# Patient Record
Sex: FEMALE | Race: WHITE | Employment: FULL TIME | URBAN - METROPOLITAN AREA
[De-identification: names, ages, dates, MRNs, and addresses within clinical notes are randomized per-mention and may not be internally consistent; named-entity substitution may affect disease eponyms.]

---

## 2018-03-12 ENCOUNTER — HOSPITAL ENCOUNTER (OUTPATIENT)
Dept: PREADMISSION TESTING | Age: 53
Discharge: HOME | End: 2018-03-12
Payer: COMMERCIAL

## 2018-03-12 VITALS
TEMPERATURE: 97.9 F | HEART RATE: 55 BPM | SYSTOLIC BLOOD PRESSURE: 112 MMHG | WEIGHT: 226 LBS | HEIGHT: 67 IN | DIASTOLIC BLOOD PRESSURE: 76 MMHG | BODY MASS INDEX: 35.47 KG/M2

## 2018-03-12 LAB
ABO + RH BLD: NORMAL
ANION GAP SERPL CALC-SCNC: 4 MMOL/L (ref 5–15)
APPEARANCE UR: CLEAR
BACTERIA URNS QL MICRO: ABNORMAL /HPF
BILIRUB UR QL: NEGATIVE
BLOOD GROUP ANTIBODIES SERPL: NORMAL
BUN SERPL-MCNC: 17 MG/DL (ref 6–20)
BUN/CREAT SERPL: 20 (ref 12–20)
CALCIUM SERPL-MCNC: 9.4 MG/DL (ref 8.5–10.1)
CHLORIDE SERPL-SCNC: 104 MMOL/L (ref 97–108)
CO2 SERPL-SCNC: 31 MMOL/L (ref 21–32)
COLOR UR: ABNORMAL
CREAT SERPL-MCNC: 0.84 MG/DL (ref 0.55–1.02)
EPITH CASTS URNS QL MICRO: ABNORMAL /LPF
ERYTHROCYTE [DISTWIDTH] IN BLOOD BY AUTOMATED COUNT: 12.1 % (ref 11.5–14.5)
EST. AVERAGE GLUCOSE BLD GHB EST-MCNC: 120 MG/DL
GLUCOSE SERPL-MCNC: 89 MG/DL (ref 65–100)
GLUCOSE UR STRIP.AUTO-MCNC: NEGATIVE MG/DL
HBA1C MFR BLD: 5.8 % (ref 4.2–6.3)
HCT VFR BLD AUTO: 43.6 % (ref 35–47)
HGB BLD-MCNC: 13.7 G/DL (ref 11.5–16)
HGB UR QL STRIP: NEGATIVE
INR PPP: 1 (ref 0.9–1.1)
KETONES UR QL STRIP.AUTO: NEGATIVE MG/DL
LEUKOCYTE ESTERASE UR QL STRIP.AUTO: ABNORMAL
MCH RBC QN AUTO: 27.1 PG (ref 26–34)
MCHC RBC AUTO-ENTMCNC: 31.4 G/DL (ref 30–36.5)
MCV RBC AUTO: 86.2 FL (ref 80–99)
NITRITE UR QL STRIP.AUTO: NEGATIVE
NRBC # BLD: 0 K/UL (ref 0–0.01)
NRBC BLD-RTO: 0 PER 100 WBC
PH UR STRIP: 6 [PH] (ref 5–8)
PLATELET # BLD AUTO: 330 K/UL (ref 150–400)
PMV BLD AUTO: 10.1 FL (ref 8.9–12.9)
POTASSIUM SERPL-SCNC: 4.5 MMOL/L (ref 3.5–5.1)
PROT UR STRIP-MCNC: NEGATIVE MG/DL
PROTHROMBIN TIME: 10.1 SEC (ref 9–11.1)
RBC # BLD AUTO: 5.06 M/UL (ref 3.8–5.2)
RBC #/AREA URNS HPF: ABNORMAL /HPF (ref 0–5)
SODIUM SERPL-SCNC: 139 MMOL/L (ref 136–145)
SP GR UR REFRACTOMETRY: 1.01 (ref 1–1.03)
SPECIMEN EXP DATE BLD: NORMAL
UA: UC IF INDICATED,UAUC: ABNORMAL
UROBILINOGEN UR QL STRIP.AUTO: 0.2 EU/DL (ref 0.2–1)
WBC # BLD AUTO: 8.3 K/UL (ref 3.6–11)
WBC URNS QL MICRO: ABNORMAL /HPF (ref 0–4)

## 2018-03-12 PROCEDURE — 85027 COMPLETE CBC AUTOMATED: CPT | Performed by: ORTHOPAEDIC SURGERY

## 2018-03-12 PROCEDURE — 93005 ELECTROCARDIOGRAM TRACING: CPT

## 2018-03-12 PROCEDURE — 85610 PROTHROMBIN TIME: CPT | Performed by: ORTHOPAEDIC SURGERY

## 2018-03-12 PROCEDURE — 86901 BLOOD TYPING SEROLOGIC RH(D): CPT | Performed by: ORTHOPAEDIC SURGERY

## 2018-03-12 PROCEDURE — 81001 URINALYSIS AUTO W/SCOPE: CPT | Performed by: ORTHOPAEDIC SURGERY

## 2018-03-12 PROCEDURE — 83036 HEMOGLOBIN GLYCOSYLATED A1C: CPT | Performed by: ORTHOPAEDIC SURGERY

## 2018-03-12 PROCEDURE — 80048 BASIC METABOLIC PNL TOTAL CA: CPT | Performed by: ORTHOPAEDIC SURGERY

## 2018-03-12 PROCEDURE — 87086 URINE CULTURE/COLONY COUNT: CPT | Performed by: ORTHOPAEDIC SURGERY

## 2018-03-12 RX ORDER — DEXTROAMPHETAMINE SACCHARATE, AMPHETAMINE ASPARTATE MONOHYDRATE, DEXTROAMPHETAMINE SULFATE AND AMPHETAMINE SULFATE 5; 5; 5; 5 MG/1; MG/1; MG/1; MG/1
20 CAPSULE, EXTENDED RELEASE ORAL
COMMUNITY

## 2018-03-12 RX ORDER — BISMUTH SUBSALICYLATE 262 MG
1 TABLET,CHEWABLE ORAL DAILY
COMMUNITY

## 2018-03-12 RX ORDER — LEVOTHYROXINE SODIUM 50 UG/1
50 TABLET ORAL
COMMUNITY

## 2018-03-12 RX ORDER — HYDROCHLOROTHIAZIDE 25 MG/1
25 TABLET ORAL DAILY
COMMUNITY

## 2018-03-12 RX ORDER — IBUPROFEN 800 MG/1
800 TABLET ORAL DAILY
COMMUNITY
End: 2018-03-28

## 2018-03-12 RX ORDER — CHOLECALCIFEROL (VITAMIN D3) 125 MCG
220 CAPSULE ORAL AS NEEDED
COMMUNITY
End: 2018-03-28

## 2018-03-12 RX ORDER — ATORVASTATIN CALCIUM 20 MG/1
TABLET, FILM COATED ORAL DAILY
COMMUNITY

## 2018-03-13 LAB
ATRIAL RATE: 60 BPM
BACTERIA SPEC CULT: NORMAL
CALCULATED P AXIS, ECG09: 54 DEGREES
CALCULATED R AXIS, ECG10: 8 DEGREES
CALCULATED T AXIS, ECG11: 17 DEGREES
CC UR VC: NORMAL
DIAGNOSIS, 93000: NORMAL
P-R INTERVAL, ECG05: 150 MS
Q-T INTERVAL, ECG07: 452 MS
QRS DURATION, ECG06: 90 MS
QTC CALCULATION (BEZET), ECG08: 452 MS
SERVICE CMNT-IMP: NORMAL
SERVICE CMNT-IMP: NORMAL
VENTRICULAR RATE, ECG03: 60 BPM

## 2018-03-13 NOTE — PERIOP NOTES
Faxed PAT testing reports to Dr. Delores Morillo. Voicemail message left for Shannen/Dr. Raymond's office RE: abnormal UA, urine culture pending.

## 2018-03-26 ENCOUNTER — HOSPITAL ENCOUNTER (INPATIENT)
Age: 53
LOS: 2 days | Discharge: HOME HEALTH | DRG: 470 | End: 2018-03-28
Attending: ORTHOPAEDIC SURGERY | Admitting: ORTHOPAEDIC SURGERY
Payer: COMMERCIAL

## 2018-03-26 ENCOUNTER — ANESTHESIA EVENT (OUTPATIENT)
Dept: SURGERY | Age: 53
DRG: 470 | End: 2018-03-26
Payer: COMMERCIAL

## 2018-03-26 ENCOUNTER — ANESTHESIA (OUTPATIENT)
Dept: SURGERY | Age: 53
DRG: 470 | End: 2018-03-26
Payer: COMMERCIAL

## 2018-03-26 DIAGNOSIS — M17.12 PRIMARY OSTEOARTHRITIS OF LEFT KNEE: Primary | ICD-10-CM

## 2018-03-26 PROCEDURE — 77030011640 HC PAD GRND REM COVD -A: Performed by: ORTHOPAEDIC SURGERY

## 2018-03-26 PROCEDURE — 76060000034 HC ANESTHESIA 1.5 TO 2 HR: Performed by: ORTHOPAEDIC SURGERY

## 2018-03-26 PROCEDURE — 77030006822 HC BLD SAW SAG BRSM -B: Performed by: ORTHOPAEDIC SURGERY

## 2018-03-26 PROCEDURE — 0SRD0J9 REPLACEMENT OF LEFT KNEE JOINT WITH SYNTHETIC SUBSTITUTE, CEMENTED, OPEN APPROACH: ICD-10-PCS | Performed by: ORTHOPAEDIC SURGERY

## 2018-03-26 PROCEDURE — 74011000250 HC RX REV CODE- 250: Performed by: ORTHOPAEDIC SURGERY

## 2018-03-26 PROCEDURE — 77030031139 HC SUT VCRL2 J&J -A: Performed by: ORTHOPAEDIC SURGERY

## 2018-03-26 PROCEDURE — 77030034850: Performed by: ORTHOPAEDIC SURGERY

## 2018-03-26 PROCEDURE — 77030020788: Performed by: ORTHOPAEDIC SURGERY

## 2018-03-26 PROCEDURE — C9290 INJ, BUPIVACAINE LIPOSOME: HCPCS | Performed by: ORTHOPAEDIC SURGERY

## 2018-03-26 PROCEDURE — 77030020782 HC GWN BAIR PAWS FLX 3M -B

## 2018-03-26 PROCEDURE — C1713 ANCHOR/SCREW BN/BN,TIS/BN: HCPCS | Performed by: ORTHOPAEDIC SURGERY

## 2018-03-26 PROCEDURE — 77030012935 HC DRSG AQUACEL BMS -B: Performed by: ORTHOPAEDIC SURGERY

## 2018-03-26 PROCEDURE — 77030018836 HC SOL IRR NACL ICUM -A: Performed by: ORTHOPAEDIC SURGERY

## 2018-03-26 PROCEDURE — 74011250636 HC RX REV CODE- 250/636

## 2018-03-26 PROCEDURE — 76210000006 HC OR PH I REC 0.5 TO 1 HR: Performed by: ORTHOPAEDIC SURGERY

## 2018-03-26 PROCEDURE — 74011250637 HC RX REV CODE- 250/637: Performed by: ORTHOPAEDIC SURGERY

## 2018-03-26 PROCEDURE — 77030018846 HC SOL IRR STRL H20 ICUM -A: Performed by: ORTHOPAEDIC SURGERY

## 2018-03-26 PROCEDURE — 77030008467 HC STPLR SKN COVD -B: Performed by: ORTHOPAEDIC SURGERY

## 2018-03-26 PROCEDURE — 77030003601 HC NDL NRV BLK BBMI -A

## 2018-03-26 PROCEDURE — 77030010783 HC BOWL MX BN CEM J&J -B: Performed by: ORTHOPAEDIC SURGERY

## 2018-03-26 PROCEDURE — C1776 JOINT DEVICE (IMPLANTABLE): HCPCS | Performed by: ORTHOPAEDIC SURGERY

## 2018-03-26 PROCEDURE — 74011000250 HC RX REV CODE- 250

## 2018-03-26 PROCEDURE — 76010000162 HC OR TIME 1.5 TO 2 HR INTENSV-TIER 1: Performed by: ORTHOPAEDIC SURGERY

## 2018-03-26 PROCEDURE — 77030003666 HC NDL SPINAL BD -A: Performed by: ANESTHESIOLOGY

## 2018-03-26 PROCEDURE — 77030028224 HC PDNG CST BSNM -A: Performed by: ORTHOPAEDIC SURGERY

## 2018-03-26 PROCEDURE — 74011250636 HC RX REV CODE- 250/636: Performed by: ORTHOPAEDIC SURGERY

## 2018-03-26 PROCEDURE — 77030027138 HC INCENT SPIROMETER -A

## 2018-03-26 PROCEDURE — 77030014125 HC TY EPDRL BBMI -B: Performed by: ANESTHESIOLOGY

## 2018-03-26 PROCEDURE — 65270000029 HC RM PRIVATE

## 2018-03-26 PROCEDURE — 36415 COLL VENOUS BLD VENIPUNCTURE: CPT | Performed by: ORTHOPAEDIC SURGERY

## 2018-03-26 PROCEDURE — 77030002933 HC SUT MCRYL J&J -A: Performed by: ORTHOPAEDIC SURGERY

## 2018-03-26 PROCEDURE — 74011000258 HC RX REV CODE- 258

## 2018-03-26 PROCEDURE — 74011000258 HC RX REV CODE- 258: Performed by: ORTHOPAEDIC SURGERY

## 2018-03-26 DEVICE — PAT NXGN 35X9.0 POLYETH --: Type: IMPLANTABLE DEVICE | Site: KNEE | Status: FUNCTIONAL

## 2018-03-26 DEVICE — PLUG STEM TIV FLX TAPR FOR MG II ST BNE SCR NXGN: Type: IMPLANTABLE DEVICE | Site: KNEE | Status: FUNCTIONAL

## 2018-03-26 DEVICE — PLATE TIB STEM PC NXGN SZ 5 --: Type: IMPLANTABLE DEVICE | Site: KNEE | Status: FUNCTIONAL

## 2018-03-26 DEVICE — CEMENT BNE 40GM FULL DOSE PMMA W/ GENT HI VISC RADPQ LNG: Type: IMPLANTABLE DEVICE | Site: KNEE | Status: FUNCTIONAL

## 2018-03-26 DEVICE — IMPLANTABLE DEVICE: Type: IMPLANTABLE DEVICE | Site: KNEE | Status: FUNCTIONAL

## 2018-03-26 DEVICE — COMPONENT ARTC SURF CR 5-6 UNIV 46X74X14 MM KNEE CONSTRN GRN: Type: IMPLANTABLE DEVICE | Site: KNEE | Status: FUNCTIONAL

## 2018-03-26 RX ORDER — SODIUM CHLORIDE 0.9 % (FLUSH) 0.9 %
5-10 SYRINGE (ML) INJECTION AS NEEDED
Status: DISCONTINUED | OUTPATIENT
Start: 2018-03-26 | End: 2018-03-28 | Stop reason: HOSPADM

## 2018-03-26 RX ORDER — DEXAMETHASONE SODIUM PHOSPHATE 4 MG/ML
INJECTION, SOLUTION INTRA-ARTICULAR; INTRALESIONAL; INTRAMUSCULAR; INTRAVENOUS; SOFT TISSUE AS NEEDED
Status: DISCONTINUED | OUTPATIENT
Start: 2018-03-26 | End: 2018-03-26 | Stop reason: HOSPADM

## 2018-03-26 RX ORDER — POLYETHYLENE GLYCOL 3350 17 G/17G
17 POWDER, FOR SOLUTION ORAL DAILY
Status: DISCONTINUED | OUTPATIENT
Start: 2018-03-27 | End: 2018-03-28 | Stop reason: HOSPADM

## 2018-03-26 RX ORDER — OXYCODONE HYDROCHLORIDE 5 MG/1
10 TABLET ORAL
Status: DISCONTINUED | OUTPATIENT
Start: 2018-03-26 | End: 2018-03-28 | Stop reason: HOSPADM

## 2018-03-26 RX ORDER — CELECOXIB 200 MG/1
200 CAPSULE ORAL 2 TIMES DAILY
Status: DISCONTINUED | OUTPATIENT
Start: 2018-03-27 | End: 2018-03-28 | Stop reason: HOSPADM

## 2018-03-26 RX ORDER — SODIUM CHLORIDE 9 MG/ML
125 INJECTION, SOLUTION INTRAVENOUS CONTINUOUS
Status: DISPENSED | OUTPATIENT
Start: 2018-03-26 | End: 2018-03-27

## 2018-03-26 RX ORDER — FACIAL-BODY WIPES
10 EACH TOPICAL DAILY PRN
Status: DISCONTINUED | OUTPATIENT
Start: 2018-03-28 | End: 2018-03-28 | Stop reason: HOSPADM

## 2018-03-26 RX ORDER — CEFAZOLIN SODIUM 2 G/50ML
2 SOLUTION INTRAVENOUS EVERY 8 HOURS
Status: COMPLETED | OUTPATIENT
Start: 2018-03-26 | End: 2018-03-27

## 2018-03-26 RX ORDER — MORPHINE SULFATE 2 MG/ML
2 INJECTION, SOLUTION INTRAMUSCULAR; INTRAVENOUS
Status: DISPENSED | OUTPATIENT
Start: 2018-03-26 | End: 2018-03-27

## 2018-03-26 RX ORDER — SODIUM CHLORIDE, SODIUM LACTATE, POTASSIUM CHLORIDE, CALCIUM CHLORIDE 600; 310; 30; 20 MG/100ML; MG/100ML; MG/100ML; MG/100ML
INJECTION, SOLUTION INTRAVENOUS
Status: DISCONTINUED | OUTPATIENT
Start: 2018-03-26 | End: 2018-03-26 | Stop reason: HOSPADM

## 2018-03-26 RX ORDER — CELECOXIB 200 MG/1
200 CAPSULE ORAL ONCE
Status: COMPLETED | OUTPATIENT
Start: 2018-03-26 | End: 2018-03-26

## 2018-03-26 RX ORDER — DEXAMETHASONE SODIUM PHOSPHATE 10 MG/ML
4 INJECTION INTRAMUSCULAR; INTRAVENOUS ONCE
Status: DISCONTINUED | OUTPATIENT
Start: 2018-03-26 | End: 2018-03-26 | Stop reason: HOSPADM

## 2018-03-26 RX ORDER — PROPOFOL 10 MG/ML
INJECTION, EMULSION INTRAVENOUS
Status: DISCONTINUED | OUTPATIENT
Start: 2018-03-26 | End: 2018-03-26 | Stop reason: HOSPADM

## 2018-03-26 RX ORDER — SODIUM CHLORIDE 0.9 % (FLUSH) 0.9 %
5-10 SYRINGE (ML) INJECTION EVERY 8 HOURS
Status: DISCONTINUED | OUTPATIENT
Start: 2018-03-27 | End: 2018-03-28 | Stop reason: HOSPADM

## 2018-03-26 RX ORDER — ACETAMINOPHEN 500 MG
1000 TABLET ORAL ONCE
Status: COMPLETED | OUTPATIENT
Start: 2018-03-26 | End: 2018-03-26

## 2018-03-26 RX ORDER — GLYCOPYRROLATE 0.2 MG/ML
INJECTION INTRAMUSCULAR; INTRAVENOUS AS NEEDED
Status: DISCONTINUED | OUTPATIENT
Start: 2018-03-26 | End: 2018-03-26 | Stop reason: HOSPADM

## 2018-03-26 RX ORDER — ONDANSETRON 2 MG/ML
INJECTION INTRAMUSCULAR; INTRAVENOUS AS NEEDED
Status: DISCONTINUED | OUTPATIENT
Start: 2018-03-26 | End: 2018-03-26 | Stop reason: HOSPADM

## 2018-03-26 RX ORDER — LEVOTHYROXINE SODIUM 50 UG/1
50 TABLET ORAL
Status: DISCONTINUED | OUTPATIENT
Start: 2018-03-27 | End: 2018-03-28 | Stop reason: HOSPADM

## 2018-03-26 RX ORDER — MIDAZOLAM HYDROCHLORIDE 1 MG/ML
INJECTION, SOLUTION INTRAMUSCULAR; INTRAVENOUS AS NEEDED
Status: DISCONTINUED | OUTPATIENT
Start: 2018-03-26 | End: 2018-03-26 | Stop reason: HOSPADM

## 2018-03-26 RX ORDER — ACETAMINOPHEN 500 MG
1000 TABLET ORAL EVERY 6 HOURS
Status: DISCONTINUED | OUTPATIENT
Start: 2018-03-26 | End: 2018-03-28 | Stop reason: HOSPADM

## 2018-03-26 RX ORDER — BUPIVACAINE HYDROCHLORIDE 5 MG/ML
INJECTION, SOLUTION EPIDURAL; INTRACAUDAL AS NEEDED
Status: DISCONTINUED | OUTPATIENT
Start: 2018-03-26 | End: 2018-03-26 | Stop reason: HOSPADM

## 2018-03-26 RX ORDER — DEXTROAMPHETAMINE SACCHARATE, AMPHETAMINE ASPARTATE MONOHYDRATE, DEXTROAMPHETAMINE SULFATE AND AMPHETAMINE SULFATE 1.25; 1.25; 1.25; 1.25 MG/1; MG/1; MG/1; MG/1
20 CAPSULE, EXTENDED RELEASE ORAL
Status: DISCONTINUED | OUTPATIENT
Start: 2018-03-27 | End: 2018-03-26 | Stop reason: SDDI

## 2018-03-26 RX ORDER — ONDANSETRON 2 MG/ML
4 INJECTION INTRAMUSCULAR; INTRAVENOUS
Status: ACTIVE | OUTPATIENT
Start: 2018-03-26 | End: 2018-03-27

## 2018-03-26 RX ORDER — CEFAZOLIN SODIUM IN 0.9 % NACL 2 G/100 ML
PLASTIC BAG, INJECTION (ML) INTRAVENOUS AS NEEDED
Status: DISCONTINUED | OUTPATIENT
Start: 2018-03-26 | End: 2018-03-26 | Stop reason: HOSPADM

## 2018-03-26 RX ORDER — ASPIRIN 325 MG
325 TABLET, DELAYED RELEASE (ENTERIC COATED) ORAL 2 TIMES DAILY
Status: DISCONTINUED | OUTPATIENT
Start: 2018-03-26 | End: 2018-03-28 | Stop reason: HOSPADM

## 2018-03-26 RX ORDER — CEFAZOLIN SODIUM 2 G/50ML
2 SOLUTION INTRAVENOUS ONCE
Status: DISCONTINUED | OUTPATIENT
Start: 2018-03-26 | End: 2018-03-26 | Stop reason: HOSPADM

## 2018-03-26 RX ORDER — HYDROCHLOROTHIAZIDE 25 MG/1
25 TABLET ORAL DAILY
Status: DISCONTINUED | OUTPATIENT
Start: 2018-03-27 | End: 2018-03-28 | Stop reason: HOSPADM

## 2018-03-26 RX ORDER — LIDOCAINE HYDROCHLORIDE 20 MG/ML
INJECTION, SOLUTION EPIDURAL; INFILTRATION; INTRACAUDAL; PERINEURAL AS NEEDED
Status: DISCONTINUED | OUTPATIENT
Start: 2018-03-26 | End: 2018-03-26 | Stop reason: HOSPADM

## 2018-03-26 RX ORDER — AMOXICILLIN 250 MG
1 CAPSULE ORAL 2 TIMES DAILY
Status: DISCONTINUED | OUTPATIENT
Start: 2018-03-26 | End: 2018-03-28 | Stop reason: HOSPADM

## 2018-03-26 RX ORDER — NALOXONE HYDROCHLORIDE 0.4 MG/ML
0.4 INJECTION, SOLUTION INTRAMUSCULAR; INTRAVENOUS; SUBCUTANEOUS AS NEEDED
Status: DISCONTINUED | OUTPATIENT
Start: 2018-03-26 | End: 2018-03-28 | Stop reason: HOSPADM

## 2018-03-26 RX ORDER — TRAMADOL HYDROCHLORIDE 50 MG/1
50 TABLET ORAL
Status: DISCONTINUED | OUTPATIENT
Start: 2018-03-26 | End: 2018-03-28 | Stop reason: HOSPADM

## 2018-03-26 RX ADMIN — SODIUM CHLORIDE, SODIUM LACTATE, POTASSIUM CHLORIDE, CALCIUM CHLORIDE: 600; 310; 30; 20 INJECTION, SOLUTION INTRAVENOUS at 11:04

## 2018-03-26 RX ADMIN — LIDOCAINE HYDROCHLORIDE 40 MG: 20 INJECTION, SOLUTION EPIDURAL; INFILTRATION; INTRACAUDAL; PERINEURAL at 12:36

## 2018-03-26 RX ADMIN — ACETAMINOPHEN 1000 MG: 500 TABLET, FILM COATED ORAL at 17:12

## 2018-03-26 RX ADMIN — CEFAZOLIN SODIUM 2 G: 2 SOLUTION INTRAVENOUS at 19:48

## 2018-03-26 RX ADMIN — DEXAMETHASONE SODIUM PHOSPHATE 8 MG: 4 INJECTION, SOLUTION INTRA-ARTICULAR; INTRALESIONAL; INTRAMUSCULAR; INTRAVENOUS; SOFT TISSUE at 12:17

## 2018-03-26 RX ADMIN — BUPIVACAINE HYDROCHLORIDE 15 MG: 5 INJECTION, SOLUTION EPIDURAL; INTRACAUDAL at 12:12

## 2018-03-26 RX ADMIN — CELECOXIB 200 MG: 200 CAPSULE ORAL at 11:37

## 2018-03-26 RX ADMIN — ACETAMINOPHEN 1000 MG: 500 TABLET, FILM COATED ORAL at 11:37

## 2018-03-26 RX ADMIN — SODIUM CHLORIDE, SODIUM LACTATE, POTASSIUM CHLORIDE, CALCIUM CHLORIDE: 600; 310; 30; 20 INJECTION, SOLUTION INTRAVENOUS at 12:31

## 2018-03-26 RX ADMIN — PROPOFOL 50 MCG/KG/MIN: 10 INJECTION, EMULSION INTRAVENOUS at 12:03

## 2018-03-26 RX ADMIN — Medication 2 G: at 12:34

## 2018-03-26 RX ADMIN — SODIUM CHLORIDE, SODIUM LACTATE, POTASSIUM CHLORIDE, CALCIUM CHLORIDE: 600; 310; 30; 20 INJECTION, SOLUTION INTRAVENOUS at 13:41

## 2018-03-26 RX ADMIN — OXYCODONE HYDROCHLORIDE 10 MG: 5 TABLET ORAL at 23:08

## 2018-03-26 RX ADMIN — GLYCOPYRROLATE 0.2 MG: 0.2 INJECTION INTRAMUSCULAR; INTRAVENOUS at 11:58

## 2018-03-26 RX ADMIN — ACETAMINOPHEN 1000 MG: 500 TABLET, FILM COATED ORAL at 23:08

## 2018-03-26 RX ADMIN — ONDANSETRON 4 MG: 2 INJECTION INTRAMUSCULAR; INTRAVENOUS at 12:17

## 2018-03-26 RX ADMIN — MIDAZOLAM HYDROCHLORIDE 2 MG: 1 INJECTION, SOLUTION INTRAMUSCULAR; INTRAVENOUS at 12:16

## 2018-03-26 RX ADMIN — MIDAZOLAM HYDROCHLORIDE 2 MG: 1 INJECTION, SOLUTION INTRAMUSCULAR; INTRAVENOUS at 11:58

## 2018-03-26 RX ADMIN — STANDARDIZED SENNA CONCENTRATE AND DOCUSATE SODIUM 1 TABLET: 8.6; 5 TABLET, FILM COATED ORAL at 17:12

## 2018-03-26 RX ADMIN — ASPIRIN 325 MG: 325 TABLET, DELAYED RELEASE ORAL at 17:12

## 2018-03-26 RX ADMIN — MORPHINE SULFATE 2 MG: 2 INJECTION, SOLUTION INTRAMUSCULAR; INTRAVENOUS at 19:41

## 2018-03-26 RX ADMIN — SODIUM CHLORIDE 125 ML/HR: 900 INJECTION, SOLUTION INTRAVENOUS at 14:25

## 2018-03-26 RX ADMIN — SODIUM CHLORIDE 125 ML/HR: 900 INJECTION, SOLUTION INTRAVENOUS at 23:08

## 2018-03-26 NOTE — OP NOTES
Name: Drew Cortez  MRN:  [id number]  : 1965  Age:  45 y.o. Surgery Date: 3/26/2018      OPERATIVE REPORT - LEFT TOTAL KNEE REPLACEMENT    PREOPERATIVE DIAGNOSIS: Osteoarthritis, left knee. POSTOPERATIVE DIAGNOSIS: Osteoarthritis, left knee. PROCEDURE PERFORMED: Left total knee arthroplasty. SURGEON: Yessenia Gonzalez MD    FIRST ASSISTANT: Stephon    ANESTHESIA: Spinal    PRE-OP ANTIBIOTIC: Ancef 2g    COMPLICATIONS: None. ESTIMATED BLOOD LOSS: 50 mL. SPECIMENS REMOVED: None. COMPONENTS IMPLANTED:   Implant Name Type Inv. Item Serial No.  Lot No. LRB No. Used Action   CEMENT BNE GENTAMC GHV 40GM -- SMARTSET - SN/A  CEMENT BNE GENTAMC GHV 40GM -- SMARTSET N/A [facility] N/A Left 2 Implanted   PLUG STEM TAPR NEXGEN --  - SN/A  PLUG STEM TAPR NEXGEN --  N/A [employer] [id number] Left 1 Implanted   PLATE TIB STEM PC NXGN SZ 5 --  - SN/A  PLATE TIB STEM PC NXGN SZ 5 --  N/A [employer] [id number] Left 1 Implanted   COMPNT FEM PC NXGN CR-FLX SZ E --  - SN/A  COMPNT FEM PC NXGN CR-FLX SZ E --  N/A [employer] [id number] Left 1 Implanted   PAT NXGN 35X9.0 POLYETH --  - SN/A  PAT NXGN 35X9.0 POLYETH --  N/A [employer] [id number] Left 1 Implanted   INSERT TIB ANTR AC SZ 5-6 14MM -- NEXGEN GRN - SN/A   INSERT TIB ANTR AC SZ 5-6 14MM -- NEXGEN GRN N/A [employer] [id number] Left 1 Implanted       INDICATIONS: The patient is an 45 yrs female with progressive debilitating left knee pain due to severe osteoarthritis. Symptoms have progressed despite comprehensive conservative treatment and she presents for left total knee replacement. Risks, benefits, alternatives of the procedure were reviewed with the patient in detail and the patient desires to proceed. The patient understands the risk for perioperative medical complications. DESCRIPTION OF PROCEDURE: Spinal anesthesia was initiated. Preoperative dose of antibiotic was given. Narvaez catheter was placed.  The left lower extremity was prepped and draped in the usual sterile fashion. The skin was covered with Ioban occlusive dressing. The left lower extremity was exsanguinated. A medial parapatellar incision was made to the left knee. The left knee was exposed and the distal femur was cut at 5 degrees distal femoral valgus. Femur was sized for a size E. An AP cutting block was placed, rotated based on bony landmarks. Femoral cuts were completed for a size E. The tibia was subluxed and a neutral varus/valgus proximal tibial cut was made matching the patient's slope. The meniscal remnants were removed. The posterior osteophytes were removed from the femur. Gaps were examined. The flexion and extension gaps were 14 mm. The femur was finished for a E, tibia for a 5. Trials were placed. Patella was cut and a 35 mm trial was fitted . The knee tracked well with all trial implants. The trials were then removed. Bony surfaces were drilled, lavaged, and dried. All components were cemented. Excess cement was removed. A 14 mm polyethylene component was placed. After the cement had fully cured, the knee was ranged and  irrigated copiously with pulsatile lavage. All surrounding soft tissues were infiltrated with local anesthetic. The arthrotomy was closed with #2 Vicryl sutures and 0 Vicryl sutures. Skin and subcutaneous tissues were irrigated and closed in standard fashion. Sterile dressing was applied. There were no complications. The procedure was a LEFT TOTAL KNEE REPLACEMENT. A Von total knee construct was utilized. No specimens were obtained/sent. Kalpeshlings was of vital assisted throughout the duration of the procedure. The patient was transferred to the recovery room in stable condition.       Rico Woods MD

## 2018-03-26 NOTE — ANESTHESIA PREPROCEDURE EVALUATION
Anesthetic History   No history of anesthetic complications            Review of Systems / Medical History  Patient summary reviewed, nursing notes reviewed and pertinent labs reviewed    Pulmonary  Within defined limits                 Neuro/Psych   Within defined limits           Cardiovascular  Within defined limits                     GI/Hepatic/Renal  Within defined limits              Endo/Other  Within defined limits    Hypothyroidism  Arthritis     Other Findings              Physical Exam    Airway  Mallampati: II  TM Distance: > 6 cm  Neck ROM: normal range of motion   Mouth opening: Normal     Cardiovascular  Regular rate and rhythm,  S1 and S2 normal,  no murmur, click, rub, or gallop             Dental  No notable dental hx       Pulmonary  Breath sounds clear to auscultation               Abdominal  GI exam deferred       Other Findings            Anesthetic Plan    ASA: 2  Anesthesia type: spinal      Post-op pain plan if not by surgeon: peripheral nerve block single    Induction: Intravenous  Anesthetic plan and risks discussed with: Patient

## 2018-03-26 NOTE — PROGRESS NOTES
TRANSFER - IN REPORT:    Verbal report received from Mayda(name) on Cory Morgan  being received from [employer]) for routine post - op      Report consisted of patients Situation, Background, Assessment and   Recommendations(SBAR). Information from the following report(s) SBAR, Kardex, Intake/Output and MAR was reviewed with the receiving nurse. Opportunity for questions and clarification was provided. Assessment completed upon patients arrival to unit and care assumed.

## 2018-03-26 NOTE — IP AVS SNAPSHOT
[address] [place] [address] 
[phone] Patient: Louise Knapp MRN: [id number] :1965 About your hospitalization You were admitted on:  2018 You last received care in the:  [id number] [place] [address] You were discharged on:  2018 Why you were hospitalized Your primary diagnosis was:  Not on File Your diagnoses also included:  Left Knee Djd, Primary Osteoarthritis Of Left Knee Follow-up Information Follow up With Details Comments Contact Info AT [place] [id number] [phone] MD Morgan [place] [address] [facility] 
[phone] Discharge Orders None A check olivier indicates which time of day the medication should be taken. My Medications START taking these medications Instructions Each Dose to Equal  
 Morning Noon Evening Bedtime  
 aspirin delayed-release 325 mg tablet Your last dose was: Your next dose is: Take 1 Tab by mouth two (2) times a day. 325 mg  
    
   
   
   
  
 oxyCODONE IR 5 mg immediate release tablet Commonly known as:  Johan Rashid Your last dose was: Your next dose is: Take 1 Tab by mouth every three (3) hours as needed. Max Daily Amount: 40 mg.  
 5 mg CONTINUE taking these medications Instructions Each Dose to Equal  
 Morning Noon Evening Bedtime ADDERALL XR 20 mg XR capsule Generic drug:  amphetamine-dextroamphetamine XR Your last dose was: Your next dose is: Take 20 mg by mouth every morning. 20 mg  
    
   
   
   
  
 DIGESTIVE ADVANTAGE PO Your last dose was: Your next dose is: Take  by mouth. 3 GUMMIES DAILY  
     
   
   
   
  
 hydroCHLOROthiazide 25 mg tablet Commonly known as:  HYDRODIURIL  
   
 Your last dose was: Your next dose is: Take 25 mg by mouth daily. Indications: Edema 25 mg  
    
   
   
   
  
 levothyroxine 50 mcg tablet Commonly known as:  SYNTHROID Your last dose was: Your next dose is: Take 50 mcg by mouth Daily (before breakfast). 50 mcg LIPITOR 20 mg tablet Generic drug:  atorvastatin Your last dose was: Your next dose is: Take  by mouth daily. multivitamin tablet Commonly known as:  ONE A DAY Your last dose was: Your next dose is: Take 1 Tab by mouth daily. 1 Tab STOP taking these medications ALEVE 220 mg Cap Generic drug:  naproxen sodium  
   
  
 ibuprofen 800 mg tablet Commonly known as:  MOTRIN Where to Get Your Medications Information on where to get these meds will be given to you by the nurse or doctor. ! Ask your nurse or doctor about these medications  
  aspirin delayed-release 325 mg tablet  
 oxyCODONE IR 5 mg immediate release tablet Opioid Education Prescription Opioids: What You Need to Know: 
 
Prescription opioids can be used to help relieve moderate-to-severe pain and are often prescribed following a surgery or injury, or for certain health conditions. These medications can be an important part of treatment but also come with serious risks. Opioids are strong pain medicines. Examples include hydrocodone, oxycodone, fentanyl, and morphine. Heroin is an example of an illegal opioid. It is important to work with your health care provider to make sure you are getting the safest, most effective care. WHAT ARE THE RISKS AND SIDE EFFECTS OF OPIOID USE? Prescription opioids carry serious risks of addiction and overdose, especially with prolonged use.   An opioid overdose, often marked by slow breathing, can cause sudden death. The use of prescription opioids can have a number of side effects as well, even when taken as directed. · Tolerance-meaning you might need to take more of a medication for the same pain relief · Physical dependence-meaning you have symptoms of withdrawal when the medication is stopped. Withdrawal symptoms can include nausea, sweating, chills, diarrhea, stomach cramps, and muscle aches. Withdrawal can last up to several weeks, depending on which drug you took and how long you took it. · Increased sensitivity to pain · Constipation · Nausea, vomiting, and dry mouth · Sleepiness and dizziness · Confusion · Depression · Low levels of testosterone that can result in lower sex drive, energy, and strength · Itching and sweating RISKS ARE GREATER WITH:      
· History of drug misuse, substance use disorder, or overdose · Mental health conditions (such as depression or anxiety) · Sleep apnea · Older age (72 years or older) · Pregnancy Avoid alcohol while taking prescription opioids. Also, unless specifically advised by your health care provider, medications to avoid include: · Benzodiazepines (such as Xanax or Valium) · Muscle relaxants (such as Soma or Flexeril) · Hypnotics (such as Ambien or Lunesta) · Other prescription opioids KNOW YOUR OPTIONS Talk to your health care provider about ways to manage your pain that don't involve prescription opioids. Some of these options may actually work better and have fewer risks and side effects. Options may include: 
· Pain relievers such as acetaminophen, ibuprofen, and naproxen · Some medications that are also used for depression or seizures · Physical therapy and exercise · Counseling to help patients learn how to cope better with triggers of pain and stress. · Application of heat or cold compress · Massage therapy · Relaxation techniques Be Informed Make sure you know the name of your medication, how much and how often to take it, and its potential risks & side effects. IF YOU ARE PRESCRIBED OPIOIDS FOR PAIN: 
· Never take opioids in greater amounts or more often than prescribed. Remember the goal is not to be pain-free but to manage your pain at a tolerable level. · Follow up with your primary care provider to: · Work together to create a plan on how to manage your pain. · Talk about ways to help manage your pain that don't involve prescription opioids. · Talk about any and all concerns and side effects. · Help prevent misuse and abuse. · Never sell or share prescription opioids · Help prevent misuse and abuse. · Store prescription opioids in a secure place and out of reach of others (this may include visitors, children, friends, and family). · Safely dispose of unused/unwanted prescription opioids: Find your community drug take-back program or your pharmacy mail-back program, or flush them down the toilet, following guidance from the Food and Drug Administration (www.fda.gov/Drugs/ResourcesForYou). · Visit www.cdc.gov/drugoverdose to learn about the risks of opioid abuse and overdose. · If you believe you may be struggling with addiction, tell your health care provider and ask for guidance or call [employer] at 2-652-725-TQGD. Discharge Instructions Discharge Instructions Knee Replacement Dr. Thea Doyle Patient Name: Mellie Bernheim Date of procedure: 3/26/2018 Procedure: Procedure(s): LEFT TOTAL KNEE ARTHROPLASTY Surgeon: Surgeon(s) and Role: Martin Fernandez MD - Primary * Jeanine Pryor DO 
PCP: Wesley Milner MD 
Date of discharge: No discharge date for patient encounter. Follow up appointments ? Follow up with Dr. Thea Doyle in 3 weeks. Call [phone] to make an appointment. ? If home health has been arranged for you the agency will contact you to arrange dates/times for visits. Please call them if you do not hear from them within 24 hours after you are discharged When to call your Orthopaedic Surgeon: Call [phone]. If you call after 5pm or on a weekend, the on call physician will be contacted ? Unrelieved pain ? Signs of infection-if your incision is red; continues to have drainage; drainage has a foul odor or if you have a persistent fever over 101 degrees ? Signs of a blood clot in your leg-calf pain, tenderness, redness, swelling of lower leg When to call your Primary Care Physician: 
? Concerns about medical conditions such as diabetes, high blood pressure, asthma, congestive heart failure ? Call if blood sugars are elevated, persistent headache or dizziness, coughing or congestion, constipation or diarrhea, burning with urination, abnormal heart rate When to call [id number] go to the nearest emergency room ? Acute onset of chest pain, shortness of breath, difficulty breathing Activity ? Weight bearing as tolerated with walker or crutches. Refer to pages 23-31 of your handbook for instructions and pictures ? Complete your Home Exercise Program daily as instructed by your therapist.  Refer to pages 33-41 of your handbook for instructions and pictures ? Get up every one hour and walk (except at night when sleeping) ? Do not drive or operate heavy machinery Incision Care ? The Aquacel (brown, waterproof) surgical dressing is to remain on your knee for 7 days. On the 7th day have someone gently peel the dressing off by carefully lifting the edge and stretching it slightly to break the adhesive seal and leave incision open to air. You may take a shower with the Aquacel dressing in place.  
? You do have staples in your knee incision; if present they will be removed by the [address] [place] [address] [facility] staff in 10-14 days and steri-strips will be applied. Leave the steri-strips on until they fall off Preventing blood clots ? Take Aspirin as prescribed. Pain management ? Take pain medication as prescribed; decrease the amount you use as your pain lessens ? Avoid alcoholic beverages while taking pain medication ? Do not take any over-the-counter medication for pain except Tylenol (acetaminophen) ? Please be aware that many medications contain Tylenol. We do not want you to over medicate so please read the information below as a guide. Do not take more than 3 Grams of Tylenol in a 24 hour period. (There are 1000 milligrams in one Gram) 
o 325 mg of Tylenol per tablet (do not take more than 9 tablets in 24 hours) 
o 500 mg of Tylenol per tablet (do not take more than 6 tablets in 24 hours) o . 
? You may place an ice bag on your knee for 15-20 minutes after exercising and as needed throughout the day and night Diet ? Resume usual diet; drink plenty of fluids; eat foods high in fiber ? You may want to take a stool softener (such as Senokot-S or Colace) to prevent constipation while you are taking pain medication. If constipation occurs, take a laxative (such as Dulcolax tablets, Milk of Magnesia, or a suppository) [facility] Center[facility] Announcement We are excited to announce that we are making your provider's discharge notes available to you in [facility]. You will see these notes when they are completed and signed by the physician that discharged you from your recent hospital stay. If you have any questions or concerns about any information you see in [facility], please call the Health Information Department where you were seen or reach out to your Primary Care Provider for more information about your plan of care. Introducing [facility]! Ariana Louis introduces [facility] patient portal. Now you can access parts of your medical record, email your doctor's office, and request medication refills online. 1. In your internet browser, go to [email or web address] [email or web address] 2. Click on the First Time User? Click Here link in the Sign In box. You will see the New Member Sign Up page. 3. Enter your [email or web address] Access Code exactly as it appears below. You will not need to use this code after youve completed the sign-up process. If you do not sign up before the expiration date, you must request a new code. · [email or web address] Access Code: [email or web address] Expires: 6/10/2018  8:13 AM 
 
4. Enter the last four digits of your Social Security Number (xxxx) and Date of Birth (mm/dd/yyyy) as indicated and click Submit. You will be taken to the next sign-up page. 5. Create a Quiklyt ID. This will be your [email or web address] login ID and cannot be changed, so think of one that is secure and easy to remember. 6. Create a [email or web address] password. You can change your password at any time. 7. Enter your Password Reset Question and Answer. This can be used at a later time if you forget your password. 8. Enter your e-mail address. You will receive e-mail notification when new information is available in [address] 9. Click Sign Up. You can now view and download portions of your medical record. 10. Click the Download Summary menu link to download a portable copy of your medical information. If you have questions, please visit the Frequently Asked Questions section of the [email or web address] website. Remember, [email or web address]com is NOT to be used for urgent needs. For medical emergencies, dial 911. Now available from your iPhone and Android! Introducing Mike Atwood As a [facility] patient, I wanted to make you aware of our electronic visit tool called Mike Atwood. [facility] 24/7 allows you to connect within minutes with a medical provider 24 hours a day, seven days a week via a mobile device or tablet or logging into a secure website from your computer. You can access Mike Atwood from anywhere in the [place]. A virtual visit might be right for you when you have a simple condition and feel like you just dont want to get out of bed, or cant get away from work for an appointment, when your regular [employer] provider is not available (evenings, weekends or holidays), or when youre out of town and need minor care. Electronic visits cost only $49 and if the [employer] 24/7 provider determines a prescription is needed to treat your condition, one can be electronically transmitted to a nearby pharmacy*. Please take a moment to enroll today if you have not already done so. The enrollment process is free and takes just a few minutes. To enroll, please download the [employer] 24/7 kulwinder to your tablet or phone, or visit [email or web address] to enroll on your computer. And, as an [address] [place] [address] patient with a [facility] account, the results of your visits will be scanned into your electronic medical record and your primary care provider will be able to view the scanned results. We urge you to continue to see your regular [employer] provider for your ongoing medical care. And while your primary care provider may not be the one available when you seek a [facility] virtual visit, the peace of mind you get from getting a real diagnosis real time can be priceless. For more information on [employer], view our Frequently Asked Questions (FAQs) at [email or web address]. Sincerely, 
 
Angy Mosley MD 
Chief Medical Officer [employer] *:  certain medications cannot be prescribed via [facility] Unresulted Labs-Please follow up with your PCP about these lab tests Order Current Status POST EXPOSURE PROFILE Preliminary result Providers Seen During Your Hospitalization Provider Specialty Primary office phone Becca Machado MD Orthopedic Surgery [phone] Your Primary Care Physician (PCP) Primary Care Physician Office Phone Office Fax Anoop Galvan [phone] You are allergic to the following No active allergies Recent Documentation Height Weight BMI OB Status Smoking Status 1.702 m 102.5 kg 35.4 kg/m2 Postmenopausal Never Smoker Emergency Contacts Name Discharge Info Relation Home Work [facility] SERVICES DISCHARGE CAREGIVER [3] Spouse [3] [phone] Patient Belongings The following personal items are in your possession at time of discharge: 
  Dental Appliances: None  Visual Aid: Glasses, At bedside, With patient Please provide this summary of care documentation to your next provider. Signatures-by signing, you are acknowledging that this After Visit Summary has been reviewed with you and you have received a copy. Patient Signature:  ____________________________________________________________ Date:  ____________________________________________________________  
  
[facility] Provider Signature:  ____________________________________________________________ Date:  ____________________________________________________________

## 2018-03-26 NOTE — PROGRESS NOTES
PT Note:    Orders received and acknowledged. Chart reviewed and attempted to see for PT evaluation. Patient remains numb from spinal anesthesia. RN agreeable to mobilize patient once motor/sensation improve. Will follow up tomorrow for PT evaluation.

## 2018-03-26 NOTE — ANESTHESIA PROCEDURE NOTES
Peripheral Block    Start time: 3/26/2018 11:24 AM  End time: 3/26/2018 11:33 AM  Performed by: CARRIE Moreno  Authorized by: CARRIE Moreno       Pre-procedure: Indications: at surgeon's request and post-op pain management    Preanesthetic Checklist: patient identified, risks and benefits discussed, site marked, timeout performed and patient being monitored      Block Type:   Block Type:   Adductor canal  Laterality:  Left  Monitoring:  Standard ASA monitoring, continuous pulse ox, frequent vital sign checks, heart rate, responsive to questions and oxygen  Injection Technique:  Single shot  Procedures: ultrasound guided    Patient Position: supine  Prep: chlorhexidine    Needle Type:  Stimuplex  Needle Gauge:  22 G  Needle Localization:  Ultrasound guidance  Medication Injected:  0.5%  ropivacaine  Volume (mL):  25    Assessment:  Number of attempts:  1  Injection Assessment:  Incremental injection every 5 mL, local visualized surrounding nerve on ultrasound, negative aspiration for blood, no paresthesia, no intravascular symptoms and ultrasound image on chart  Patient tolerance:  Patient tolerated the procedure well with no immediate complications

## 2018-03-27 LAB
ANION GAP SERPL CALC-SCNC: 6 MMOL/L (ref 5–15)
BUN SERPL-MCNC: 10 MG/DL (ref 6–20)
BUN/CREAT SERPL: 15 (ref 12–20)
CALCIUM SERPL-MCNC: 8.6 MG/DL (ref 8.5–10.1)
CHLORIDE SERPL-SCNC: 110 MMOL/L (ref 97–108)
CO2 SERPL-SCNC: 28 MMOL/L (ref 21–32)
CREAT SERPL-MCNC: 0.67 MG/DL (ref 0.55–1.02)
GLUCOSE SERPL-MCNC: 120 MG/DL (ref 65–100)
HGB BLD-MCNC: 12.2 G/DL (ref 11.5–16)
POTASSIUM SERPL-SCNC: 4.1 MMOL/L (ref 3.5–5.1)
SODIUM SERPL-SCNC: 144 MMOL/L (ref 136–145)

## 2018-03-27 PROCEDURE — 97110 THERAPEUTIC EXERCISES: CPT

## 2018-03-27 PROCEDURE — 97161 PT EVAL LOW COMPLEX 20 MIN: CPT

## 2018-03-27 PROCEDURE — 36415 COLL VENOUS BLD VENIPUNCTURE: CPT | Performed by: ORTHOPAEDIC SURGERY

## 2018-03-27 PROCEDURE — 74011250637 HC RX REV CODE- 250/637: Performed by: ORTHOPAEDIC SURGERY

## 2018-03-27 PROCEDURE — 97116 GAIT TRAINING THERAPY: CPT

## 2018-03-27 PROCEDURE — 74011250636 HC RX REV CODE- 250/636: Performed by: ORTHOPAEDIC SURGERY

## 2018-03-27 PROCEDURE — 65270000029 HC RM PRIVATE

## 2018-03-27 PROCEDURE — 80048 BASIC METABOLIC PNL TOTAL CA: CPT | Performed by: ORTHOPAEDIC SURGERY

## 2018-03-27 PROCEDURE — 85018 HEMOGLOBIN: CPT | Performed by: ORTHOPAEDIC SURGERY

## 2018-03-27 RX ADMIN — ACETAMINOPHEN 1000 MG: 500 TABLET, FILM COATED ORAL at 17:21

## 2018-03-27 RX ADMIN — STANDARDIZED SENNA CONCENTRATE AND DOCUSATE SODIUM 1 TABLET: 8.6; 5 TABLET, FILM COATED ORAL at 17:21

## 2018-03-27 RX ADMIN — ACETAMINOPHEN 1000 MG: 500 TABLET, FILM COATED ORAL at 12:18

## 2018-03-27 RX ADMIN — ASPIRIN 325 MG: 325 TABLET, DELAYED RELEASE ORAL at 08:40

## 2018-03-27 RX ADMIN — ACETAMINOPHEN 1000 MG: 500 TABLET, FILM COATED ORAL at 06:17

## 2018-03-27 RX ADMIN — OXYCODONE HYDROCHLORIDE 5 MG: 5 TABLET ORAL at 13:28

## 2018-03-27 RX ADMIN — CELECOXIB 200 MG: 200 CAPSULE ORAL at 08:41

## 2018-03-27 RX ADMIN — LEVOTHYROXINE SODIUM 50 MCG: 50 TABLET ORAL at 07:24

## 2018-03-27 RX ADMIN — OXYCODONE HYDROCHLORIDE 5 MG: 5 TABLET ORAL at 09:49

## 2018-03-27 RX ADMIN — ASPIRIN 325 MG: 325 TABLET, DELAYED RELEASE ORAL at 17:21

## 2018-03-27 RX ADMIN — POLYETHYLENE GLYCOL 3350 17 G: 17 POWDER, FOR SOLUTION ORAL at 08:40

## 2018-03-27 RX ADMIN — CEFAZOLIN SODIUM 2 G: 2 SOLUTION INTRAVENOUS at 04:24

## 2018-03-27 RX ADMIN — OXYCODONE HYDROCHLORIDE 10 MG: 5 TABLET ORAL at 06:17

## 2018-03-27 RX ADMIN — STANDARDIZED SENNA CONCENTRATE AND DOCUSATE SODIUM 1 TABLET: 8.6; 5 TABLET, FILM COATED ORAL at 08:40

## 2018-03-27 RX ADMIN — Medication 10 ML: at 13:44

## 2018-03-27 RX ADMIN — OXYCODONE HYDROCHLORIDE 5 MG: 5 TABLET ORAL at 16:12

## 2018-03-27 RX ADMIN — OXYCODONE HYDROCHLORIDE 10 MG: 5 TABLET ORAL at 19:28

## 2018-03-27 RX ADMIN — CELECOXIB 200 MG: 200 CAPSULE ORAL at 17:21

## 2018-03-27 RX ADMIN — OXYCODONE HYDROCHLORIDE 10 MG: 5 TABLET ORAL at 02:09

## 2018-03-27 NOTE — PROGRESS NOTES
Problem: Mobility Impaired (Adult and Pediatric)  Goal: *Acute Goals and Plan of Care (Insert Text)  Physical Therapy Goals  Initiated 3/27/2018    1. Patient will move from supine to sit and sit to supine  and roll side to side in bed with modified independence within 4 days. 2. Patient will perform sit to stand with modified independence within 4 days. 3. Patient will ambulate with modified independence for 300 feet with the least restrictive device within 4 days. 4. Patient will ascend/descend 3 stairs with 1 handrail(s) with modified independence within 4 days. 5. Patient will perform home exercise program per protocol with modified independence within 4 days. 6. Patient will demonstrate AROM 0-90 degrees in operative joint within 4 days. physical Therapy knee EVALUATION  Patient: Radha Boudreaux (74 y.o. female)  Date: 3/27/2018  Primary Diagnosis: OA LEFT KNEE  Left knee DJD  Primary osteoarthritis of left knee  Procedure(s) (LRB):  LEFT TOTAL KNEE ARTHROPLASTY (Left) 1 Day Post-Op   Precautions:   Fall, WBAT    ASSESSMENT :  Based on the objective data described below, the patient presents with decreased mobility after L TKA POD1. Patient was up with nursing earlier this morning and all VSS. Her pain control has been good overall and she is moving well with the walker. Worked on step through gait pattern, which she was able to do intermittently. Her ROM is very good and there is minimal edema at this point. She will need a walker for home use and will have support from her family on discharge. She will only need to manage 3 steps to enter the home and she has a cane already. Reviewed activity recommendations and restrictions and she did attend the pre-op education class. Performed exercises and stretching in bed after gait training and ice in place. Expect that she will progress well and be able to return home with HHPT follow up per pathway. .    Patient will benefit from skilled intervention to address the above impairments. Patients rehabilitation potential is considered to be Good  Factors which may influence rehabilitation potential include:   []         None noted  []         Mental ability/status  [x]         Medical condition  []         Home/family situation and support systems  []         Safety awareness  [x]         Pain tolerance/management  []         Other:      PLAN :  Recommendations and Planned Interventions:  [x]           Bed Mobility Training             []    Neuromuscular Re-Education  [x]           Transfer Training                   []    Orthotic/Prosthetic Training  [x]           Gait Training                         []    Modalities  [x]           Therapeutic Exercises           []    Edema Management/Control  [x]           Therapeutic Activities            [x]    Patient and Family Training/Education  []           Other (comment):    Frequency/Duration: Patient will be followed by physical therapy twice daily to address goals. Discharge Recommendations: Home Health  Further Equipment Recommendations for Discharge: rolling walker     SUBJECTIVE:   Patient stated I am feeling pretty good -better than before surgery.     OBJECTIVE DATA SUMMARY:   HISTORY:    Past Medical History:   Diagnosis Date    Arthritis     OSTEOARTHRITIS    Cancer ([facility] [address]     [facility] - LEFT SIDE OF NECK     Hypothyroidism      Past Surgical History:   Procedure Laterality Date    HX  SECTION   AND     HX KNEE ARTHROSCOPY Left 2017    LEFT KNEE ARTHROSCOPY    HX ORTHOPAEDIC      RT THUMB- DISLOCATED REPAIR    HX ORTHOPAEDIC  2014    LEFT FOOT PLANTAR PLATE    HX ORTHOPAEDIC  2016    RIGHT FOOT PLANTAR PLATE     Prior Level of Function/Home Situation: independent, lives with family  Personal factors and/or comorbidities impacting plan of care: L TKA    Home Situation  Home Environment: Private residence  # Steps to Enter: 3  One/Two Story Residence: Two story  Living Alone: No  Support Systems: Family member(s)  Patient Expects to be Discharged to[de-identified] Private residence  Current DME Used/Available at Home: iris Gonzalez    EXAMINATION/PRESENTATION/DECISION MAKING:   Critical Behavior:  Neurologic State: Alert  Orientation Level: Oriented X4        Hearing: Auditory  Auditory Impairment: None  Skin:  Post op bandage in place  Range Of Motion:  AROM: Within functional limits (L knee 0-90)                       Strength:    Strength: Within functional limits (LLE 3-/5)                    Tone & Sensation:   Tone: Normal              Sensation: Intact               Coordination:  Coordination: Within functional limits  Vision:      Functional Mobility:  Bed Mobility:  Rolling: Modified independent  Supine to Sit: Modified independent  Sit to Supine: Modified independent; Adaptive equipment; Additional time (usign strap assist)     Transfers:  Sit to Stand: Contact guard assistance; Adaptive equipment; Additional time  Stand to Sit: Contact guard assistance; Adaptive equipment; Additional time        Bed to Chair: Contact guard assistance; Adaptive equipment; Additional time              Balance:   Sitting: Intact; Without support  Standing: Intact; With support  Ambulation/Gait Training:  Distance (ft): 175 Feet (ft)  Assistive Device: Gait belt;Walker, rolling  Ambulation - Level of Assistance: Contact guard assistance; Adaptive equipment; Additional time        Gait Abnormalities: Antalgic;Decreased step clearance; Step to gait (able to step through with RLE at times)  Right Side Weight Bearing: Full  Left Side Weight Bearing: As tolerated  Base of Support: Widened  Stance: Left decreased  Speed/Jacqueline: Pace decreased (<100 feet/min)  Step Length: Left shortened;Right shortened  Swing Pattern: Left asymmetrical     Interventions: Safety awareness training; Tactile cues; Verbal cues; Visual/Demos            Stairs:               Therapeutic Exercises:   Reviewed all TKA post op exercises    Functional Measure:  Barthel Index:    Bathin (inferred)  Bladder: 10  Bowels: 10  Groomin  Dressin  Feeding: 10  Mobility: 10  Stairs: 0  Toilet Use: 10  Transfer (Bed to Chair and Back): 10  Total: 75       Barthel and G-code impairment scale:  Percentage of impairment CH  0% CI  1-19% CJ  20-39% CK  40-59% CL  60-79% CM  80-99% CN  100%   Barthel Score 0-100 100 99-80 79-60 59-40 20-39 1-19   0   Barthel Score 0-20 20 17-19 13-16 9-12 5-8 1-4 0      The Barthel ADL Index: Guidelines  1. The index should be used as a record of what a patient does, not as a record of what a patient could do. 2. The main aim is to establish degree of independence from any help, physical or verbal, however minor and for whatever reason. 3. The need for supervision renders the patient not independent. 4. A patient's performance should be established using the best available evidence. Asking the patient, friends/relatives and nurses are the usual sources, but direct observation and common sense are also important. However direct testing is not needed. 5. Usually the patient's performance over the preceding 24-48 hours is important, but occasionally longer periods will be relevant. 6. Middle categories imply that the patient supplies over 50 per cent of the effort. 7. Use of aids to be independent is allowed. Celestina Winchester, Barthel, DTreyWTrey (3335). Functional evaluation: the Barthel Index. [facility] (14)2. Kanika Recinos, JTreyJ.MSUAD, Davontalha Ontiveros.[facility], [address] [place] [address] (). Measuring the change indisability after inpatient rehabilitation; comparison of the responsiveness of the Barthel Index and Functional [place] Measure. Journal of Neurology, Neurosurgery, and Psychiatry, 66(4), 602-689. Jodie Carr N.J.A, JJ Edward, & Carolann Cade M.A. (2004.) Assessment of post-stroke quality of life in cost-effectiveness studies: The usefulness of the Barthel Index and the EuroQoL-5D.  Quality of Life Research, 13, 131-18       G codes: In compliance with CMSs Claims Based Outcome Reporting, the following G-code set was chosen for this patient based on their primary functional limitation being treated: The outcome measure chosen to determine the severity of the functional limitation was the Barthel with a score of 75/100   which was correlated with the impairment scale. ? Mobility - Walking and Moving Around:     - CURRENT STATUS: CJ - 20%-39% impaired, limited or restricted    - GOAL STATUS: CI - 1%-19% impaired, limited or restricted    - D/C STATUS:  ---------------To be determined---------------        Physical Therapy Evaluation Charge Determination   History Examination Presentation Decision-Making   MEDIUM  Complexity : 1-2 comorbidities / personal factors will impact the outcome/ POC  MEDIUM Complexity : 3 Standardized tests and measures addressing body structure, function, activity limitation and / or participation in recreation  LOW Complexity : Stable, uncomplicated  LOW Complexity : FOTO score of       Based on the above components, the patient evaluation is determined to be of the following complexity level: LOW     Pain:  Pain Scale 1: Numeric (0 - 10)  Pain Intensity 1: 4  Pain Location 1: Knee  Pain Orientation 1: Left  Pain Description 1: Aching  Pain Intervention(s) 1: Medication (see MAR)  Activity Tolerance:   Very good  Please refer to the flowsheet for vital signs taken during this treatment. After treatment:   []         Patient left in no apparent distress sitting up in chair  [x]         Patient left in no apparent distress in bed, ice in place  [x]         Call bell left within reach  [x]         Nursing notified  []         Caregiver present  []         Bed alarm activated    COMMUNICATION/EDUCATION:   The patients plan of care was discussed with: Registered Nurse.   [x]         Fall prevention education was provided and the patient/caregiver indicated understanding. [x]         Patient/family have participated as able in goal setting and plan of care. [x]         Patient/family agree to work toward stated goals and plan of care. []         Patient understands intent and goals of therapy, but is neutral about his/her participation. []         Patient is unable to participate in goal setting and plan of care.     Thank you for this referral.  Betty Glaser, PT   Time Calculation: 34 mins

## 2018-03-27 NOTE — CDMP QUERY
Patient is noted to have a BMI: 35.4 kg/m  Please clarify if this patient is:     =>Obesity (BMI of 30-39.9)  => Morbid Obesity  (BMI 40 or greater)  =>Overweight (BMI 25-29.9)  => Other weight status (specify  status)  => Unable to determine    The [address] [place] [address] has issued a statement indicating that, \"Individuals who are overweight, obese, or morbidly obese are at an increased risk for certain medical conditions when compared to persons of normal weight. Therefore, these conditions are always clinically significant and reportable when documented by the provider. \"    Presentation:    Ht: 5' 7\" (1.702 m)  Wt: 102.5 kg (226 lb)    Please clarify and document your clinical opinion in the progress notes and discharge summary, including the definitive and or presumptive diagnosis, (suspected or probable), related to the above clinical findings. Please include clinical findings supporting your diagnosis.      Thank you,  Evelin Moctezuma, RN  [phone]

## 2018-03-27 NOTE — ANESTHESIA POSTPROCEDURE EVALUATION
Post-Anesthesia Evaluation and Assessment    Patient: Antonio Ivy MRN: [id number]  SSN: [id number]    YOB: 1965  Age: 45 y.o. Sex: female       Cardiovascular Function/Vital Signs  Visit Vitals    /81 (BP 1 Location: Left arm, BP Patient Position: At rest)    Pulse (!) 47    Temp 36.8 °C (98.2 °F)    Resp 16    Ht 5' 7\" (1.702 m)    Wt 102.5 kg (226 lb)    SpO2 99%    BMI 35.4 kg/m2       Patient is status post spinal, total IV anesthesia anesthesia for Procedure(s):  LEFT TOTAL KNEE ARTHROPLASTY. Nausea/Vomiting: None    Postoperative hydration reviewed and adequate. Pain:  Pain Scale 1: Numeric (0 - 10) (03/27/18 0312)  Pain Intensity 1: 3 (03/27/18 0312)   Managed    Neurological Status:   Neuro (WDL): Exceptions to WDL (03/26/18 1446)  Neuro  Neurologic State: Alert (03/27/18 0024)  Orientation Level: Oriented X4 (03/27/18 0024)  Speech: Clear (03/27/18 0024)  LUE Motor Response: Purposeful (03/27/18 0024)  LLE Motor Response: Purposeful (03/27/18 0024)  RUE Motor Response: Purposeful (03/27/18 0024)  RLE Motor Response: Purposeful (03/27/18 0024)   At baseline    Mental Status and Level of Consciousness: Arousable    Pulmonary Status:   O2 Device: Room air (03/26/18 1527)   Adequate oxygenation and airway patent    Complications related to anesthesia: None    Post-anesthesia assessment completed.  No concerns    Signed By: Lane Riggins MD     March 27, 2018

## 2018-03-27 NOTE — PROGRESS NOTES
Problem: Discharge Planning  Goal: *Discharge to safe environment  Outcome: [facility]

## 2018-03-27 NOTE — PROGRESS NOTES
Problem: Mobility Impaired (Adult and Pediatric)  Goal: *Acute Goals and Plan of Care (Insert Text)  Physical Therapy Goals  Initiated 3/27/2018    1. Patient will move from supine to sit and sit to supine  and roll side to side in bed with modified independence within 4 days. 2. Patient will perform sit to stand with modified independence within 4 days. 3. Patient will ambulate with modified independence for 300 feet with the least restrictive device within 4 days. 4. Patient will ascend/descend 3 stairs with 1 handrail(s) with modified independence within 4 days. 5. Patient will perform home exercise program per protocol with modified independence within 4 days. 6. Patient will demonstrate AROM 0-90 degrees in operative joint within 4 days. physical Therapy TREATMENT  Patient: Zack العلي (86 y.o. female)  Date: 3/27/2018  Diagnosis: OA LEFT KNEE  Left knee DJD  Primary osteoarthritis of left knee <principal problem not specified>  Procedure(s) (LRB):  LEFT TOTAL KNEE ARTHROPLASTY (Left) 1 Day Post-Op  Precautions: Fall, WBAT  Chart, physical therapy assessment, plan of care and goals were reviewed. ASSESSMENT:  Pt seen for PM BID session. Pt agreeable to bed mobility, therex, transfers, gait/stair training with assist x1. Pt progressing well and has cleared stairs for home tomorrow using L rail for BUE support. Pt may wish to review in AM though not required for D/C. Pt doing well with all tasks and utilized gait belt for bed mobility assist Tj of LLE. Pt's RW delivered during this session as well. Recommend home tomorrow with HHPT pending continued progress and medical stability. RN notified of request for pain meds (not until 4:30p) and ice packs instead provided until available. Pt also left in propped ext for 15' goal and to ring out with any additional needs.   Progression toward goals:  [x]      Improving appropriately and progressing toward goals  []      Improving slowly and progressing toward goals  []      Not making progress toward goals and plan of care will be adjusted     PLAN:  Patient continues to benefit from skilled intervention to address the above impairments. Continue treatment per established plan of care. Discharge Recommendations:  Home Health  Further Equipment Recommendations for Discharge:  RW has been delivered to room     SUBJECTIVE:   Patient stated I love a clean bed. ..  Pt's bed changed per her request.    OBJECTIVE DATA SUMMARY:   Critical Behavior:  Neurologic State: Alert  Orientation Level: Oriented X4  Functional Mobility Training:  Bed Mobility:  Rolling:  (NT)  Supine to Sit: Modified independent (UE assist for LLE)  Sit to Supine: Modified independent (gait belt assist for LLE)           Transfers:  Sit to Stand: Supervision  Stand to Sit: Supervision        Bed to Chair:  (NT)                    Balance:  Sitting: Intact  Standing: Intact; With support  Ambulation/Gait Training:  Distance (ft): 45 Feet (ft) (x2)  Assistive Device: Gait belt;Walker, rolling  Ambulation - Level of Assistance: Contact guard assistance        Gait Abnormalities: Antalgic;Decreased step clearance (delayed step-through pattern)  Right Side Weight Bearing: Full  Left Side Weight Bearing: As tolerated  Base of Support: Widened  Stance: Left decreased  Speed/Jacqueline: Slow;Pace decreased (<100 feet/min)  Step Length: Right shortened;Left shortened  Swing Pattern: Left asymmetrical  Interventions: Verbal cues; Tactile cues    Stairs:  Number of Stairs Trained: 3  Stairs - Level of Assistance: Contact guard assistance  Rail Use: Right   Therapeutic Exercises:     EXERCISE   Sets   Reps   Active Active Assist   Passive Self ROM   Comments   Ankle Pumps 1 10 []                                        []                                        []                                        []                                           Quad Sets 1 10 [] []                                        []                                        []                                        Performed in propped ext   Hamstring Sets 1 10 []                                        []                                        []                                        []                                           Short Arc Quads 1 10 []                                        []                                        []                                        []                                           Knee Extension Stretch 1    []                                          []                                          []                                          []                                        15' goal   Heel Slides 1 10 []                                        [x]                                        []                                        []                                        Gait belt provided for ROM push     Pain:  Pain Scale 1: Numeric (0 - 10)  Pain Intensity 1: 8  Pain Location 1: Knee  Pain Orientation 1: Left  Pain Description 1: Aching  Pain Intervention(s) 1: Medication (see MAR)  Activity Tolerance:   NAD  After treatment:   [] Patient left in no apparent distress sitting up in chair  [x] Patient left in no apparent distress in bed  [x] Call bell left within reach  [x] Nursing notified  [] Caregiver present  [] Bed alarm activated    COMMUNICATION/COLLABORATION:   The patients plan of care was discussed with: Registered Nurse    Jacqueline Gore   Time Calculation: 35 mins

## 2018-03-27 NOTE — PROGRESS NOTES
Care Management Interventions  PCP Verified by CM: Yes (Dr. Ayesha Perez)  Palliative Care Criteria Met (RRAT>21 & CHF Dx)?: No  Mode of Transport at Discharge: Self  Transition of Care Consult (CM Consult): [address]: No  Reason Outside [place]: Physician referred to specific agency (doctor's office recommended AT [address] per the patient)  MyChart Signup: No  Discharge Durable Medical Equipment: Yes (Will need RW)  Health Maintenance Reviewed: Yes  Physical Therapy Consult: Yes  Occupational Therapy Consult: Yes  Speech Therapy Consult: No  Current Support Network: Lives with Spouse  Confirm Follow Up Transport: Family  Plan discussed with Pt/Family/Caregiver: Yes  Freedom of Choice Offered: Yes (NA)  [place] Resource Information Provided?: No  Discharge Location  Discharge Placement: Home with home health    Reason for Admission:      Left Total Knee Replacement  RRAT Score:      3  Plan for utilizing home health[facility]  Likelihood of Readmission:      [address] accepted the case.  Will

## 2018-03-27 NOTE — PROGRESS NOTES
Complaints: None   Events: none      GEN:  NAD. AOx3   ABD:  S/NT/ND   LLE:  Dressing C/D/I    5/5 motor    Calf nttp (Bilat)    Sensate all distribution to light touch    1+ dp/pt pulses, foot perfused      Lab Results   Component Value Date/Time    HGB 12.2 03/27/2018 04:51 AM    INR 1.0 03/12/2018 09:05 AM       Lab Results   Component Value Date/Time    Sodium 144 03/27/2018 04:51 AM    Potassium 4.1 03/27/2018 04:51 AM    Chloride 110 (H) 03/27/2018 04:51 AM    CO2 28 03/27/2018 04:51 AM    BUN 10 03/27/2018 04:51 AM    Creatinine 0.67 03/27/2018 04:51 AM    Calcium 8.6 03/27/2018 04:51 AM            POD #1 LEFT TOTAL KNEE REPLACEMENT. Satisfactory progress. ABX: Complete today  PATHWAY: D/C [place] per protocol  DVT Prophylaxis: ASA  Weight Bearing: WBAT LLE   Pain Control: PRN oral narcotics  Anticipated Discharge Date: Tomorrow   Disposition: Home, PT.

## 2018-03-27 NOTE — PROGRESS NOTES
Bedside shift change report given to Olivia Ramires RN (oncoming nurse) by TAMMY Arias RN (offgoing nurse). Report included the following information SBAR.

## 2018-03-27 NOTE — PROGRESS NOTES
Bedside shift change report given to Samir Carrasco (oncoming nurse) by Vito Zamora (offgoing nurse). Report included the following information SBAR and Kardex.

## 2018-03-28 VITALS
HEART RATE: 62 BPM | TEMPERATURE: 97.5 F | BODY MASS INDEX: 35.47 KG/M2 | DIASTOLIC BLOOD PRESSURE: 62 MMHG | SYSTOLIC BLOOD PRESSURE: 97 MMHG | RESPIRATION RATE: 15 BRPM | OXYGEN SATURATION: 99 % | WEIGHT: 226 LBS | HEIGHT: 67 IN

## 2018-03-28 LAB
HBV SURFACE AG SER QL: <0.1 INDEX
HBV SURFACE AG SER QL: NEGATIVE
HCV AB SERPL QL IA: NONREACTIVE
HCV COMMENT,HCGAC: NORMAL
HGB BLD-MCNC: 11.5 G/DL (ref 11.5–16)
HIV1 P24 AG SERPL QL IA: NONREACTIVE
HIV1+2 AB SERPL QL IA: NONREACTIVE

## 2018-03-28 PROCEDURE — 36415 COLL VENOUS BLD VENIPUNCTURE: CPT | Performed by: ORTHOPAEDIC SURGERY

## 2018-03-28 PROCEDURE — 97110 THERAPEUTIC EXERCISES: CPT

## 2018-03-28 PROCEDURE — 74011250637 HC RX REV CODE- 250/637: Performed by: ORTHOPAEDIC SURGERY

## 2018-03-28 PROCEDURE — 85018 HEMOGLOBIN: CPT | Performed by: ORTHOPAEDIC SURGERY

## 2018-03-28 PROCEDURE — 97116 GAIT TRAINING THERAPY: CPT

## 2018-03-28 RX ORDER — ASPIRIN 325 MG
325 TABLET, DELAYED RELEASE (ENTERIC COATED) ORAL 2 TIMES DAILY
Qty: 1 TAB | Refills: 0 | Status: SHIPPED
Start: 2018-03-28

## 2018-03-28 RX ORDER — OXYCODONE HYDROCHLORIDE 5 MG/1
5 TABLET ORAL
Qty: 80 TAB | Refills: 0 | Status: SHIPPED
Start: 2018-03-28

## 2018-03-28 RX ADMIN — ACETAMINOPHEN 1000 MG: 500 TABLET, FILM COATED ORAL at 00:12

## 2018-03-28 RX ADMIN — POLYETHYLENE GLYCOL 3350 17 G: 17 POWDER, FOR SOLUTION ORAL at 08:34

## 2018-03-28 RX ADMIN — OXYCODONE HYDROCHLORIDE 10 MG: 5 TABLET ORAL at 00:12

## 2018-03-28 RX ADMIN — STANDARDIZED SENNA CONCENTRATE AND DOCUSATE SODIUM 1 TABLET: 8.6; 5 TABLET, FILM COATED ORAL at 08:33

## 2018-03-28 RX ADMIN — OXYCODONE HYDROCHLORIDE 10 MG: 5 TABLET ORAL at 04:00

## 2018-03-28 RX ADMIN — OXYCODONE HYDROCHLORIDE 10 MG: 5 TABLET ORAL at 11:15

## 2018-03-28 RX ADMIN — ACETAMINOPHEN 1000 MG: 500 TABLET, FILM COATED ORAL at 06:56

## 2018-03-28 RX ADMIN — ACETAMINOPHEN 1000 MG: 500 TABLET, FILM COATED ORAL at 11:15

## 2018-03-28 RX ADMIN — TRAMADOL HYDROCHLORIDE 50 MG: 50 TABLET, FILM COATED ORAL at 08:33

## 2018-03-28 RX ADMIN — LEVOTHYROXINE SODIUM 50 MCG: 50 TABLET ORAL at 06:56

## 2018-03-28 RX ADMIN — Medication 10 ML: at 04:01

## 2018-03-28 RX ADMIN — ASPIRIN 325 MG: 325 TABLET, DELAYED RELEASE ORAL at 08:33

## 2018-03-28 RX ADMIN — CELECOXIB 200 MG: 200 CAPSULE ORAL at 08:33

## 2018-03-28 NOTE — DISCHARGE SUMMARY
[address] [place] [address]   [address] [facility] [id number]    DISCHARGE SUMMARY     Patient: Bee Burt                             Medical Record Number: [id number]                : 1965  Age: 45 y.o. Admit Date: 3/26/2018  Discharge Date:   Admission Diagnosis: OA LEFT KNEE  Left knee DJD  Primary osteoarthritis of left knee  Discharge Diagnosis: OA left knee  Procedures: Procedure(s):  LEFT TOTAL KNEE ARTHROPLASTY  Surgeon: Patrick Pride  Assistants: none  Anesthesia: spinal  Complications: None     History of Present Illness:  Bee Burt is a 45 y.o. female with a history of Left knee pain, swelling, and marked loss of function. Despite conservative management and after clinical and radiographic evaluation, it was determined that she suffered from end-stage osteoarthritis and would benefit from Procedure(s):  LEFT TOTAL KNEE ARTHROPLASTY, which she consented to undergo after a discussion of the risks, benefits, alternatives, rehab concerns, and potential complications of surgery. Hospital Course:  Bee Burt tolerated the procedure well. She was transferred  to the recovery room in stable condition. After a brief stay the patient was then transferred to the Joint Replacement Unit at [address] [place] [address].  On postoperative day #1, the dressing was clean and dry, she was neurovascularly intact. The patient was afebrile and vital signs were stable. Calves were soft and non-tender bilaterally. On postoperative day  # 2, the patient was tolerating a regular diet and making satisfactory progress with physical therapy. Hemoglobin and INR prior to discharge were   Lab Results   Component Value Date/Time    HGB 11.5 2018 03:55 AM    INR 1.0 2018 09:05 AM   .  Bee Burt made satisfactory progress with physical therapy and was discharged to Home in stable condition on postoperative day 2.   She was provided with routine postoperative instructions and advised to follow up in my office in 3 weeks following discharge from the hospital.  She was prescribed ASA for DVT prophylaxis and oxycodone for post-operative pain. Discharge Medications:  Current Discharge Medication List      START taking these medications    Details   aspirin delayed-release 325 mg tablet Take 1 Tab by mouth two (2) times a day. Qty: 1 Tab, Refills: 0      oxyCODONE IR (ROXICODONE) 5 mg immediate release tablet Take 1 Tab by mouth every three (3) hours as needed. Max Daily Amount: 40 mg.  Qty: 80 Tab, Refills: 0    Associated Diagnoses: Primary osteoarthritis of left knee         CONTINUE these medications which have NOT CHANGED    Details   hydroCHLOROthiazide (HYDRODIURIL) 25 mg tablet Take 25 mg by mouth daily. Indications: Edema      levothyroxine (SYNTHROID) 50 mcg tablet Take 50 mcg by mouth Daily (before breakfast). atorvastatin (LIPITOR) 20 mg tablet Take  by mouth daily. multivitamin (ONE A DAY) tablet Take 1 Tab by mouth daily. amphetamine-dextroamphetamine XR (ADDERALL XR) 20 mg XR capsule Take 20 mg by mouth every morning. BACILLUS COAGULANS (DIGESTIVE ADVANTAGE PO) Take  by mouth.  3 GUMMIES DAILY         STOP taking these medications       ibuprofen (MOTRIN) 800 mg tablet Comments:   Reason for Stopping:         naproxen sodium (ALEVE) 220 mg cap Comments:   Reason for Stopping:               Signed by: Oxana Chi MD  3/28/2018

## 2018-03-28 NOTE — DISCHARGE INSTRUCTIONS
Discharge Instructions Knee Replacement  Dr. Nicolle Che    Patient Name: Shakeel Suazo  Date of procedure: 3/26/2018   Procedure: Procedure(s):  LEFT TOTAL KNEE ARTHROPLASTY  Surgeon: Yoli Danielson) and Role:     * Carmen Preciado MD - Primary     * Ricardo Doty DO  PCP: Rashmi Ocasio MD  Date of discharge: No discharge date for patient encounter. Follow up appointments   Follow up with Dr. Nicolle Che in 3 weeks. Call [phone] to make an appointment.  If home health has been arranged for you the agency will contact you to arrange dates/times for visits. Please call them if you do not hear from them within 24 hours after you are discharged    When to call your Orthopaedic Surgeon: Call [phone]. If you call after 5pm or on a weekend, the on call physician will be contacted   Unrelieved pain   Signs of infection-if your incision is red; continues to have drainage; drainage has a foul odor or if you have a persistent fever over 101 degrees   Signs of a blood clot in your leg-calf pain, tenderness, redness, swelling of lower leg    When to call your Primary Care Physician:   Concerns about medical conditions such as diabetes, high blood pressure, asthma, congestive heart failure   Call if blood sugars are elevated, persistent headache or dizziness, coughing or congestion, constipation or diarrhea, burning with urination, abnormal heart rate    When to call [id number] go to the nearest emergency room   Acute onset of chest pain, shortness of breath, difficulty breathing    Activity   Weight bearing as tolerated with walker or crutches.  Refer to pages 23-31 of your handbook for instructions and pictures   Complete your Home Exercise Program daily as instructed by your therapist.  Refer to pages 33-41 of your handbook for instructions and pictures   Get up every one hour and walk (except at night when sleeping)   Do not drive or operate heavy machinery    Incision Care   The Aquacel (brown, waterproof) surgical dressing is to remain on your knee for 7 days. On the 7th day have someone gently peel the dressing off by carefully lifting the edge and stretching it slightly to break the adhesive seal and leave incision open to air. You may take a shower with the Aquacel dressing in place.  You do have staples in your knee incision; if present they will be removed by the [address] [place] [address] [facility] staff in 10-14 days and steri-strips will be applied. Leave the steri-strips on until they fall off      Preventing blood clots    Take Aspirin as prescribed. Pain management   Take pain medication as prescribed; decrease the amount you use as your pain lessens   Avoid alcoholic beverages while taking pain medication   Do not take any over-the-counter medication for pain except Tylenol (acetaminophen)   Please be aware that many medications contain Tylenol. We do not want you to over medicate so please read the information below as a guide. Do not take more than 3 Grams of Tylenol in a 24 hour period. (There are 1000 milligrams in one Gram)  o 325 mg of Tylenol per tablet (do not take more than 9 tablets in 24 hours)  o 500 mg of Tylenol per tablet (do not take more than 6 tablets in 24 hours)  o .   You may place an ice bag on your knee for 15-20 minutes after exercising and as needed throughout the day and night    Diet   Resume usual diet; drink plenty of fluids; eat foods high in fiber   You may want to take a stool softener (such as Senokot-S or Colace) to prevent constipation while you are taking pain medication.   If constipation occurs, take a laxative (such as Dulcolax tablets, Milk of Magnesia, or a suppository)

## 2018-03-28 NOTE — PROGRESS NOTES
Bedside shift change report given to Hailey White (oncoming nurse) by Vinnie Deshpande (offgoing nurse). Report included the following information SBAR, Kardex, Intake/Output and MAR.

## 2018-03-28 NOTE — PROGRESS NOTES
I have reviewed discharge instructions and RX  with the patient and aughter. Rx was filled with outpatient pharmacy and are with the pt. The patient and daughter verbalized understanding. Opportunities for questions were provided. Vital signs are stable. Adequate I/Os. IV taken out and pressure dressing applied. All belongings are with the patient, including walker. Discharge via wheelchair with volunteer.

## 2018-03-28 NOTE — PROGRESS NOTES
Bedside and Verbal shift change report given to Юлия Harley RN (oncoming nurse) by Sofiya Hernandez RN (offgoing nurse). Report included the following information SBAR, Kardex, Procedure Summary, Intake/Output, MAR, Accordion and Recent Results.

## 2018-03-28 NOTE — PROGRESS NOTES
Problem: Mobility Impaired (Adult and Pediatric)  Goal: *Acute Goals and Plan of Care (Insert Text)  Physical Therapy Goals  Initiated 3/27/2018    1. Patient will move from supine to sit and sit to supine  and roll side to side in bed with modified independence within 4 days. 2. Patient will perform sit to stand with modified independence within 4 days. 3. Patient will ambulate with modified independence for 300 feet with the least restrictive device within 4 days. 4. Patient will ascend/descend 3 stairs with 1 handrail(s) with modified independence within 4 days. 5. Patient will perform home exercise program per protocol with modified independence within 4 days. 6. Patient will demonstrate AROM 0-90 degrees in operative joint within 4 days. physical Therapy TREATMENT/DISCHARGE  Patient: Gloria Mccord (49 y.o. female)  Date: 3/28/2018  Diagnosis: OA LEFT KNEE  Left knee DJD  Primary osteoarthritis of left knee <principal problem not specified>  Procedure(s) (LRB):  LEFT TOTAL KNEE ARTHROPLASTY (Left) 2 Days Post-Op  Precautions: Fall, WBAT  Chart, physical therapy assessment, plan of care and goals were reviewed. ASSESSMENT:  Patient reports good overall pain management with oxycodone and was up and dressed on arrival.  Daughter present for session and patient demonstrated gait, stairs and exercises without difficulty. She was able to ambulate with a continuous step through pattern with the walker but does remain antalgic in her gait. She has very good ROM and is very motivated to improve. She is clear for D/C home from a mobility standpoint and will follow up with HHPT. Progression toward goals:  [x]          Improving appropriately and progressing toward goals  []          Improving slowly and progressing toward goals  []          Not making progress toward goals and plan of care will be adjusted     PLAN:  Patient will be discharged from physical therapy at this time.   Rationale for discharge:  [x]     Goals Achieved  []     [address]  []     Patient not participating in therapy  []     Other:  Discharge Recommendations:  Home Health  Further Equipment Recommendations for Discharge:  rolling walker and it has been delivered     SUBJECTIVE:   Patient stated I am feeling pretty good.     OBJECTIVE DATA SUMMARY:   Critical Behavior:  Neurologic State: Alert  Orientation Level: Oriented X4        Range of Motion:  AROM:  (0-90 L knee with stretching)                       Functional Mobility Training:  Bed Mobility:  Rolling: Modified independent  Supine to Sit: Modified independent  Sit to Supine: Modified independent  Scooting: Modified independent        Transfers:  Sit to Stand: Modified independent; Adaptive equipment; Additional time  Stand to Sit: Modified independent; Adaptive equipment; Additional time        Bed to Chair: Modified independent; Adaptive equipment; Additional time                    Balance:  Sitting: Intact; Without support  Standing: Intact; With support  Ambulation/Gait Training:  Distance (ft): 300 Feet (ft)  Assistive Device: Gait belt;Walker, rolling  Ambulation - Level of Assistance: Modified independent; Adaptive equipment; Additional time        Gait Abnormalities: Antalgic;Decreased step clearance  Right Side Weight Bearing: Full  Left Side Weight Bearing: As tolerated  Base of Support: Widened  Stance: Left decreased  Speed/Jacqueline: Pace decreased (<100 feet/min)  Step Length: Left shortened;Right shortened  Swing Pattern: Left asymmetrical     Interventions: Safety awareness training; Tactile cues; Verbal cues; Visual/Demos           Stairs:  Number of Stairs Trained: 4 (twice, once with side step and once cane and rail)  Stairs - Level of Assistance: Modified independent; Adaptive equipment; Additional time     Therapeutic Exercises:     EXERCISE   Sets   Reps   Active Active Assist   Passive Self ROM   Comments   Ankle Pumps   [x] []                                           []                                           []                                              Quad Sets   [x]                                           []                                           []                                           []                                              Hamstring Sets   [x]                                           []                                           []                                           []                                              Short Arc Quads   []                                           [x]                                           []                                           []                                              Knee Extension Stretch     []                                             [x]                                             []                                             []                                              Heel Slides   [x]                                           []                                           []                                           []                                              Long Arc Quads   []                                           []                                           []                                           []                                              Knee Flexion Stretch   []                                           [x]                                           []                                           []                                              Straight Leg Raises   []                                           [x]                                           []                                           []                                                  Pain:  Pain Scale 1: Numeric (0 - 10)  Pain Intensity 1: 2  Pain Location 1: Knee  Pain Orientation 1: Left  Pain Description 1: Aching  Pain Intervention(s) 1: Medication (see MAR)  Activity Tolerance:   Very good  Please refer to the flowsheet for vital signs taken during this treatment.   After treatment:   []  Patient left in no apparent distress sitting up in chair  [x]  Patient left in no apparent distress in bed  [x]  Call bell left within reach  [x]  Nursing notified  [x]  Caregiver present  []  Bed alarm activated    COMMUNICATION/COLLABORATION:   The patients plan of care was discussed with: Registered Nurse    Kristan Herrera, PT   Time Calculation: 31 mins

## 2018-12-10 ENCOUNTER — HOSPITAL ENCOUNTER (EMERGENCY)
Age: 53
Discharge: HOME | End: 2018-12-10
Attending: EMERGENCY MEDICINE
Payer: COMMERCIAL

## 2018-12-10 VITALS
SYSTOLIC BLOOD PRESSURE: 123 MMHG | BODY MASS INDEX: 31.35 KG/M2 | HEART RATE: 60 BPM | RESPIRATION RATE: 14 BRPM | TEMPERATURE: 98.2 F | DIASTOLIC BLOOD PRESSURE: 67 MMHG | OXYGEN SATURATION: 100 % | WEIGHT: 211.64 LBS | HEIGHT: 69 IN

## 2018-12-10 DIAGNOSIS — H33.22 LEFT RETINAL DETACHMENT: Primary | ICD-10-CM

## 2018-12-10 PROCEDURE — 99283 EMERGENCY DEPT VISIT LOW MDM: CPT

## 2018-12-10 RX ORDER — TETRACAINE HYDROCHLORIDE 5 MG/ML
1 SOLUTION OPHTHALMIC
Status: DISCONTINUED | OUTPATIENT
Start: 2018-12-10 | End: 2018-12-10 | Stop reason: HOSPADM

## 2018-12-10 NOTE — ED PROVIDER NOTES
EMERGENCY DEPARTMENT HISTORY AND PHYSICAL EXAM 
 
 
Date: 12/10/2018 Patient Name: Beth Cabrera History of Presenting Illness Chief Complaint Patient presents with  Eye Pain \"I can't see out of my left eye since Friday. \" Went to [employer] and was sent to ED after they could not find an available opthalmologist. Patient reports she has always had issues with her eyes \"Seeing floaters. \" Which she has seen a lot more recently History Provided By: Patient HPI: Beth Cabrera, 52 y.o. female with PMHx significant for hypothyroidism, presents via private vehicle to the ED with cc of vision loss in left eye. Patient states that last week she started having floaters (like little bugs) in her vision which she has had previously. She states that Wednesday she started having vision loss from the nasal part of the eye. Denies trauma. Now can only see out of LUQ of vision in left eye. No pain to eye. No history of vision problems. No surgeries to the eye. Denies jaw claudication or headaches. Denies vision problems in right eye. URI a couple of weeks ago that has resolved, but nothing else. There are no other complaints, changes, or physical findings at this time. PCP: Sarah Honeycutt MD 
 
Current Facility-Administered Medications Medication Dose Route Frequency Provider Last Rate Last Dose  tetracaine (PONTOCAINE) 0.5 % ophthalmic solution 1 Drop  1 Drop Left Eye NOW Olga Gonzalez PA-C      
 fluorescein (FUL-BELÉN) 1 mg ophthalmic strip 1 Strip  1 Strip Both Eyes NOW Olga Gonzalez PA-C Current Outpatient Medications Medication Sig Dispense Refill  aspirin delayed-release 325 mg tablet Take 1 Tab by mouth two (2) times a day. 1 Tab 0  
 oxyCODONE IR (ROXICODONE) 5 mg immediate release tablet Take 1 Tab by mouth every three (3) hours as needed.  Max Daily Amount: 40 mg. 80 Tab 0  
  hydroCHLOROthiazide (HYDRODIURIL) 25 mg tablet Take 25 mg by mouth daily. Indications: Edema  levothyroxine (SYNTHROID) 50 mcg tablet Take 50 mcg by mouth Daily (before breakfast).  atorvastatin (LIPITOR) 20 mg tablet Take  by mouth daily.  amphetamine-dextroamphetamine XR (ADDERALL XR) 20 mg XR capsule Take 20 mg by mouth every morning.  BACILLUS COAGULANS (DIGESTIVE ADVANTAGE PO) Take  by mouth. 3 GUMMIES DAILY  multivitamin (ONE A DAY) tablet Take 1 Tab by mouth daily. Past History Past Medical History: 
Past Medical History:  
Diagnosis Date  Arthritis OSTEOARTHRITIS  Cancer ([address] BCC - LEFT SIDE OF NECK  Hypothyroidism Past Surgical History: 
Past Surgical History:  
Procedure Laterality Date [address]  HX KNEE ARTHROSCOPY Left 2017 LEFT KNEE ARTHROSCOPY  
 HX ORTHOPAEDIC    
 RT THUMB- DISLOCATED REPAIR  
 HX ORTHOPAEDIC  2014 LEFT FOOT PLANTAR PLATE  
 HX ORTHOPAEDIC  2016 RIGHT FOOT PLANTAR PLATE Family History: 
Family History Problem Relation Age of Onset  Cancer Father SKIN CANCER  
 Heart Attack Father  Hypertension Father  Thyroid Disease Father  Breast Cancer Sister  Cystic Fibrosis Brother  Cystic Fibrosis Sister  Thyroid Disease Sister  Thyroid Disease Sister  Anesth Problems Neg Hx Social History: 
Social History Tobacco Use  Smoking status: Never Smoker  Smokeless tobacco: Never Used Substance Use Topics  Alcohol use: No  
 Drug use: No  
 
 
Allergies: 
No Known Allergies Review of Systems Review of Systems Constitutional: Negative for chills and fever. HENT: Negative for congestion and hearing loss. Eyes: Negative for discharge and visual disturbance. Respiratory: Negative for apnea, shortness of breath and wheezing. Cardiovascular: Negative for chest pain and palpitations. Gastrointestinal: Negative for abdominal pain, diarrhea, nausea and vomiting. Genitourinary: Negative for dysuria and frequency. Musculoskeletal: Negative for gait problem. Skin: Negative for color change. Allergic/Immunologic: Negative for immunocompromised state. Physical Exam  
Physical Exam  
Constitutional: She is oriented to person, place, and time. She appears well-developed and well-nourished. HENT:  
Head: Normocephalic and atraumatic. Eyes: Conjunctivae and EOM are normal. Pupils are equal, round, and reactive to light. Right eye exhibits no chemosis, no discharge and no exudate. No foreign body present in the right eye. Left eye exhibits no chemosis, no discharge and no exudate. No foreign body present in the left eye. Right conjunctiva is not injected. Right conjunctiva has no hemorrhage. Left conjunctiva is not injected. Left conjunctiva has no hemorrhage. Neck: Normal range of motion. Neck supple. Cardiovascular: Normal rate and regular rhythm. Pulmonary/Chest: Effort normal and breath sounds normal.  
Abdominal: Soft. Bowel sounds are normal. There is no tenderness. There is no rebound and no guarding. Musculoskeletal: Normal range of motion. Neurological: She is alert and oriented to person, place, and time. Skin: Skin is warm and dry. Psychiatric: She has a normal mood and affect. Nursing note and vitals reviewed. Diagnostic Study Results Labs - No results found for this or any previous visit (from the past 12 hour(s)). Radiologic Studies - No orders to display CT Results  (Last 48 hours) None CXR Results  (Last 48 hours) None Medical Decision Making I am the first provider for this patient. I reviewed the vital signs, available nursing notes, past medical history, past surgical history, family history and social history. Vital Signs-Reviewed the patient's vital signs. Patient Vitals for the past 12 hrs: Temp Pulse Resp BP SpO2  
12/10/18 1354 98.2 °F (36.8 °C) 68 14 142/85 100 % Devorah Wesley Records Reviewed: Nursing Notes, Old Medical Records and Previous Laboratory Studies Provider Notes (Medical Decision Making):  
Retinal detachment, vitreal hemorrhage, vitreal detachment ED Course:  
Initial assessment performed. The patients presenting problems have been discussed, and they are in agreement with the care plan formulated and outlined with them. I have encouraged them to ask questions as they arise throughout their visit. Procedure Note - Limited Bedside Ultrasound- Occular 2:45 PM 
Performed by: Trudell Pedro, MD Rexann Hatchet Indication: eval for retinal detachment, eval for hemorrhage Interpretation:abnormal findings retinal detachment The left eye was evaluated using bedside US. The globe was round with normal lens placement and shape. Normal dynamic motion of the eye was visualized. Evidence of retinal detachment was appreciated. The optic nerve sheath measurement was not measured The procedure took 1-15 minutes, and pt tolerated well. Written by Aranza Rodríguez ED Scribe, as dictated by Rexann Hatchet, DO 
 
 
 
Critical Care Time:  
None Disposition: 
DISCHARGE NOTE: 
4:50 PM 
The patient's results have been reviewed with family and/or caregiver. They verbally convey their understanding and agreement of the patient's signs, symptoms, diagnosis, treatment, and prognosis. They additionally agree to follow up as recommended in the discharge instructions or to return to the Emergency Room should the patient's condition change prior to their follow-up appointment. The family and/or caregiver verbally agrees with the care-plan and all of their questions have been answered.  The discharge instructions have also been provided to the them along with educational information regarding the patient's diagnosis and a list of reasons why the patient would want to return to the ER prior to their follow-up appointment should their condition change. Patient will follow-up with [address] [place] [address] tomorrow morning. PLAN: 
1. Current Discharge Medication List  
  
 
2. Follow-up Information None Return to ED if worse Diagnosis Clinical Impression: No diagnosis found. Attestations:

## 2018-12-10 NOTE — DISCHARGE INSTRUCTIONS
Please follow up with [id number] tomorrow morning at 07:45. Retinal Detachment: Care Instructions  Your Care Instructions    The retina is a thin nerve membrane in the back of your eye. It detects light entering the eye. Then it sends signals to your brain about what the eye sees. Retinal detachment means the retina has  from the wall of the eye. This can lead to severe vision loss or blindness. Surgery can reattach your retina. The sooner it's done, the better chance you'll be able to see well again. Follow-up care is a key part of your treatment and safety. Be sure to make and go to all appointments, and call your doctor if you are having problems. It's also a good idea to know your test results and keep a list of the medicines you take. How can you care for yourself at home? · Follow any instructions your doctor gave you. You may need to rest and sleep with your head in a certain position. You may also need to wear an eye patch or use eyedrops. · Your doctor may advise you not to travel by plane until your eye is healed. This is because the changes in air pressure may cause pain and affect your eye. When should you call for help? Call your doctor now or seek immediate medical care if:    · You have vision changes.     · You see new flashes of light.    Watch closely for changes in your health, and be sure to contact your doctor if:    · You see new or worse floaters.     · You do not get better as expected. Where can you learn more? Go to http://precious-nick.info/. Enter Y131 in the search box to learn more about \"Retinal Detachment: Care Instructions. \"  Current as of: December 3, 2017  Content Version: 11.8  © 0371-6644 [employer]. Care instructions adapted under license by [employer] (which disclaims liability or warranty for this information).  If you have questions about a medical condition or this instruction, always ask your healthcare professional. [place] [address] any warranty or liability for your use of this information.

## 2018-12-10 NOTE — ED NOTES
Since wednesday slightly darkened field of vision in the right lower quarter of the left eye, which has been progressively worsening. On Friday, vision has almost totally gone with just lateral superior vision intact Seen at  earlier today, but was unable to contact ophthalmologist 
Patient follows with [facility]. I have evaluated the patient as the Provider in Triage. I have reviewed Her  vital signs and the triage nurse assessment. I have talked with the patient and any available family and advised that I am the provider in triage and have ordered the appropriate study to initiate their work up based on the clinical presentation during my assessment. I have advised that the patient will be accommodated in the Main ED as soon as possible. I have also requested to contact the triage nurse or myself immediately if the patient experiences any changes in their condition during this brief waiting period.  
Sagar Griffin PA-C

## 2023-09-21 NOTE — PERIOP NOTES
Good Afternoon,     Pt is requesting for a refill of: Tizanidine 4 mg  Last filed:6/21/23  Last encounter:7/17/23  Up coming apt: 10/20/23  Pharmacy: [facility] in [place]      .Johnathan WEN    Medical Assistant     PATIENT GIVEN SURGICAL SITE INFECTION FAQ HANDOUT AND HAND WASHING TIP SHEET. PREOP INSTRUCTIONS REVIEWED AND PATIENT VERBALIZES UNDERSTANDING OF INSTRUCTIONS. PATIENT HAS BEEN GIVEN THE OPPORTUNITY TO ASK ADDITIONAL QUESTIONS. PREOPERATIVE INSTRUCTIONS REVIEWED WITH PATIENT. PATIENT GIVEN SIX PACK OF CHG WIPES. INSTRUCTIONS (REVIEWED IN CLASS) ON USE OF CHG WIPES. PATIENT GIVEN SSI INFECTION FAQ SHEET, INFORMATION SHEET ON DIABETIC TREATMENT CENTER AS WELL AS HAND WASHING TIPS SHEETS. MRSA/MSSA TREATMENT INSTRUCTION SHEET GIVEN WITH EXPLANATION TO PATIENT THAT THEY WILL BE NOTIFIED IF TREATMENT INSTRUCTIONS NEED TO BE INITIATED. PATIENT WAS GIVEN THE OPPORTUNITY TO ASK QUESTIONS ON THE INFORMATION PROVIDED.

## 2025-04-30 NOTE — ANESTHESIA PROCEDURE NOTES
Spinal Block    Start time: 3/26/2018 12:05 PM  End time: 3/26/2018 12:12 PM  Performed by: Dimitris Arriaga  Authorized by: CARRIE Quan     Pre-procedure:   Indications: at surgeon's request, post-op pain management, procedure for pain and primary anesthetic  Preanesthetic Checklist: patient identified, risks and benefits discussed, anesthesia consent, site marked, patient being monitored and timeout performed    Timeout Time: 12:05          Spinal Block:   Patient Position:  Seated    Prep: Betadine      Location:  L3-4  Technique:  Single shot  Local:  Lidocaine 1%  Local Dose (mL):  3    Needle:   Needle Type:  Pencan  Needle Gauge:  24 G  Attempts:  1      Events: CSF confirmed, no blood with aspiration and no paresthesia        Assessment:  Insertion:  Uncomplicated  Patient tolerance:  Patient tolerated the procedure well with no immediate complications patient prepped and draped in normal sterile fashion. Time-out was performed to confirm the surgical site and the patient's name. The patient received antibiotics IV preoperatively within 30min of incision. Bilateral pneumatic compression devices were placed on lower extremities for DVT prophylaxis prior to induction.    We initially made a 8mm incision in the superior to the umbilicus, inserted a Veress needle, confirmed needle placement and insufflated to 15 mmHg. We then inserted a 8 mm trocar midline under direct visualization, removed the Veress needle, inserted two more 8 mm trocars in the left upper quadrant under direct visualization.  At this point, we retracted the liver cephalad and identified a large paraesophageal hiatal defect. Another 8 mm trocar was inserted in the right upper quadrant. The robot was docked over the left side.   A Otf liver retractor was then inserted subxiphoid and the liver was retracted cephalad and secured being careful not to compromise vascular supply. We then used traction and the Harmonic scalpel to take down the hernia sac, starting at the right felipa. The right and anterior felipa were  from the esophagus and right pleura without injury to the pleura or posterior vagal branches. The right vagus was identified coursing along the esophagus and diving posterior and remained adhered to the esophagus and was not injured. We dissected around the esophagus 360 degrees. The crural lining was preserved on both sides of the dissection.  The left vagus was identified exiting from the mediastinum on the left and coursing anterior across the esophagus. It was preserved and not inured. The left pleura was left intact and not violated but carefully released from the hernia sac. The aorta was also left free of injury, identified and  from the hernia sac and adhesions to the esophagus. Anteriorly the dissection was undertaken to separate the pericardium from the

## (undated) DEVICE — PADDING CST 6IN STERILE --

## (undated) DEVICE — BLADE SAW W0.49XL3.15IN THK0.047IN CUT THK0.047IN REPL SAG

## (undated) DEVICE — DRAPE,EXTREMITY,89X128,STERILE: Brand: MEDLINE

## (undated) DEVICE — HOOK LOCK LATEX FREE ELASTIC BANDAGE D/L 6INX10YD

## (undated) DEVICE — HANDPIECE SET WITH BONE CLEANING TIP AND SUCTION TUBE: Brand: INTERPULSE

## (undated) DEVICE — SOLUTION IRRIG 1000ML H2O STRL BLT

## (undated) DEVICE — REM POLYHESIVE ADULT PATIENT RETURN ELECTRODE: Brand: VALLEYLAB

## (undated) DEVICE — STERILE POLYISOPRENE POWDER-FREE SURGICAL GLOVES WITH EMOLLIENT COATING: Brand: PROTEXIS

## (undated) DEVICE — DEVON™ KNEE AND BODY STRAP 60" X 3" (1.5 M X 7.6 CM): Brand: DEVON

## (undated) DEVICE — INFECTION CONTROL KIT SYS

## (undated) DEVICE — 4-PORT MANIFOLD: Brand: NEPTUNE 2

## (undated) DEVICE — SUTURE MCRYL SZ 4-0 L27IN ABSRB UD L24MM PS-1 3/8 CIR PRIM Y935H

## (undated) DEVICE — PREP SKN PREVAIL 40ML APPL --

## (undated) DEVICE — TRAY CATH 16F URIN MTR LTX -- CONVERT TO ITEM 363111

## (undated) DEVICE — PADDING CAST SPEC 6INX4YD COT --

## (undated) DEVICE — BANDAGE COMPR ELASTIC 5 YDX6 IN

## (undated) DEVICE — SLIM BODY SKIN STAPLER: Brand: APPOSE ULC

## (undated) DEVICE — 3M™ IOBAN™ 2 ANTIMICROBIAL INCISE DRAPE 6651EZ: Brand: IOBAN™ 2

## (undated) DEVICE — SUTURE VCRL SZ 2-0 L36IN ABSRB UD L40MM CT 1/2 CIR J957H

## (undated) DEVICE — BOWL BNE CEM MIX SPAT CURET SMARTMIX CTS

## (undated) DEVICE — STERILE POLYISOPRENE POWDER-FREE SURGICAL GLOVES: Brand: PROTEXIS

## (undated) DEVICE — SUTURE VCRL SZ 2 L54IN ABSRB UD L65MM TP-1 1/2 CIR J880T

## (undated) DEVICE — Device

## (undated) DEVICE — SOLUTION IRRIG 3000ML 0.9% SOD CHL FLX CONT 0797208] ICU MEDICAL INC]

## (undated) DEVICE — CONTAINER,SPECIMEN,3OZ,OR STRL: Brand: MEDLINE

## (undated) DEVICE — SCRUB DRY SURG EZ SCRUB BRUSH PREOPERATIVE GRN

## (undated) DEVICE — T4 HOOD

## (undated) DEVICE — NEEDLE HYPO 18GA L1.5IN PNK S STL HUB POLYPR SHLD REG BVL

## (undated) DEVICE — Z DISCONTINUED USE 2744636  DRESSING AQUACEL 14 IN ALG W3.5XL14IN POLYUR FLM CVR W/ HYDRCOLL

## (undated) DEVICE — BLADE SAW W073XL276IN THK0031IN CUT THK0036IN REPL SAG

## (undated) DEVICE — SYRINGE MED 20ML STD CLR PLAS LUERLOCK TIP N CTRL DISP